# Patient Record
Sex: MALE | Race: WHITE | Employment: OTHER | URBAN - METROPOLITAN AREA
[De-identification: names, ages, dates, MRNs, and addresses within clinical notes are randomized per-mention and may not be internally consistent; named-entity substitution may affect disease eponyms.]

---

## 2017-03-01 LAB — DIABETIC RETINOPATHY: NORMAL

## 2017-06-13 ENCOUNTER — TELEPHONE (OUTPATIENT)
Dept: FAMILY MEDICINE CLINIC | Age: 62
End: 2017-06-13

## 2017-06-13 DIAGNOSIS — Z00.00 PHYSICAL EXAM: Primary | ICD-10-CM

## 2017-06-20 ENCOUNTER — OFFICE VISIT (OUTPATIENT)
Dept: FAMILY MEDICINE CLINIC | Age: 62
End: 2017-06-20

## 2017-06-20 VITALS
WEIGHT: 238.6 LBS | HEART RATE: 97 BPM | OXYGEN SATURATION: 94 % | RESPIRATION RATE: 18 BRPM | SYSTOLIC BLOOD PRESSURE: 116 MMHG | BODY MASS INDEX: 29.67 KG/M2 | HEIGHT: 75 IN | DIASTOLIC BLOOD PRESSURE: 66 MMHG

## 2017-06-20 DIAGNOSIS — Z85.46 HISTORY OF PROSTATE CANCER: ICD-10-CM

## 2017-06-20 DIAGNOSIS — E78.00 PURE HYPERCHOLESTEROLEMIA: ICD-10-CM

## 2017-06-20 DIAGNOSIS — E11.9 CONTROLLED TYPE 2 DIABETES MELLITUS WITHOUT COMPLICATION, WITHOUT LONG-TERM CURRENT USE OF INSULIN (HCC): ICD-10-CM

## 2017-06-20 DIAGNOSIS — I10 ESSENTIAL HYPERTENSION: ICD-10-CM

## 2017-06-20 DIAGNOSIS — Z00.00 ANNUAL PHYSICAL EXAM: Primary | ICD-10-CM

## 2017-06-20 LAB
BILIRUBIN, POC: NORMAL
BLOOD URINE, POC: NORMAL
CLARITY, POC: CLEAR
COLOR, POC: YELLOW
CREATININE URINE POCT: NORMAL
GLUCOSE URINE, POC: NORMAL
KETONES, POC: NORMAL
LEUKOCYTE EST, POC: NORMAL
MICROALBUMIN/CREAT 24H UR: 0 MG/G{CREAT}
MICROALBUMIN/CREAT UR-RTO: NORMAL
NITRITE, POC: NORMAL
PH, POC: 5.5
PROTEIN, POC: NORMAL
SPECIFIC GRAVITY, POC: 1.02
UROBILINOGEN, POC: 1

## 2017-06-20 PROCEDURE — 82044 UR ALBUMIN SEMIQUANTITATIVE: CPT | Performed by: INTERNAL MEDICINE

## 2017-06-20 PROCEDURE — 81002 URINALYSIS NONAUTO W/O SCOPE: CPT | Performed by: INTERNAL MEDICINE

## 2017-06-20 PROCEDURE — 99396 PREV VISIT EST AGE 40-64: CPT | Performed by: INTERNAL MEDICINE

## 2017-06-20 ASSESSMENT — PATIENT HEALTH QUESTIONNAIRE - PHQ9
SUM OF ALL RESPONSES TO PHQ QUESTIONS 1-9: 0
1. LITTLE INTEREST OR PLEASURE IN DOING THINGS: 0
SUM OF ALL RESPONSES TO PHQ9 QUESTIONS 1 & 2: 0
2. FEELING DOWN, DEPRESSED OR HOPELESS: 0

## 2017-06-20 ASSESSMENT — ENCOUNTER SYMPTOMS
GASTROINTESTINAL NEGATIVE: 1
EYES NEGATIVE: 1
ALLERGIC/IMMUNOLOGIC NEGATIVE: 1
RESPIRATORY NEGATIVE: 1

## 2017-12-21 ENCOUNTER — OFFICE VISIT (OUTPATIENT)
Dept: FAMILY MEDICINE CLINIC | Age: 62
End: 2017-12-21

## 2017-12-21 VITALS
RESPIRATION RATE: 18 BRPM | BODY MASS INDEX: 29.57 KG/M2 | HEIGHT: 75 IN | DIASTOLIC BLOOD PRESSURE: 76 MMHG | WEIGHT: 237.8 LBS | OXYGEN SATURATION: 96 % | HEART RATE: 71 BPM | SYSTOLIC BLOOD PRESSURE: 132 MMHG

## 2017-12-21 DIAGNOSIS — E11.9 CONTROLLED TYPE 2 DIABETES MELLITUS WITHOUT COMPLICATION, WITHOUT LONG-TERM CURRENT USE OF INSULIN (HCC): Primary | ICD-10-CM

## 2017-12-21 DIAGNOSIS — I10 ESSENTIAL HYPERTENSION: ICD-10-CM

## 2017-12-21 DIAGNOSIS — Z85.46 HISTORY OF PROSTATE CANCER: ICD-10-CM

## 2017-12-21 DIAGNOSIS — E78.00 PURE HYPERCHOLESTEROLEMIA: ICD-10-CM

## 2017-12-21 PROCEDURE — 99214 OFFICE O/P EST MOD 30 MIN: CPT | Performed by: INTERNAL MEDICINE

## 2017-12-21 RX ORDER — PRAVASTATIN SODIUM 20 MG
20 TABLET ORAL DAILY
Qty: 30 TABLET | Refills: 5 | Status: SHIPPED | OUTPATIENT
Start: 2017-12-21 | End: 2018-06-21 | Stop reason: SDUPTHER

## 2017-12-21 RX ORDER — LISINOPRIL 20 MG/1
20 TABLET ORAL DAILY
Qty: 30 TABLET | Refills: 5 | Status: SHIPPED | OUTPATIENT
Start: 2017-12-21 | End: 2018-06-21 | Stop reason: SDUPTHER

## 2017-12-21 ASSESSMENT — ENCOUNTER SYMPTOMS
GASTROINTESTINAL NEGATIVE: 1
RESPIRATORY NEGATIVE: 1
ALLERGIC/IMMUNOLOGIC NEGATIVE: 1

## 2017-12-21 NOTE — PROGRESS NOTES
displaced. Exam reveals no gallop and no friction rub. No murmur heard. Pulses:       Carotid pulses are 2+ on the right side, and 2+ on the left side. Radial pulses are 2+ on the right side, and 2+ on the left side. Femoral pulses are 2+ on the right side, and 2+ on the left side. Popliteal pulses are 2+ on the right side, and 2+ on the left side. Dorsalis pedis pulses are 2+ on the right side, and 2+ on the left side. Posterior tibial pulses are 2+ on the right side, and 2+ on the left side. Pulmonary/Chest: Effort normal and breath sounds normal. No accessory muscle usage. No apnea, no tachypnea and no bradypnea. No respiratory distress. He has no decreased breath sounds. He has no wheezes. He has no rhonchi. He has no rales. Abdominal: Normal appearance and normal aorta. There is no hepatosplenomegaly. There is no CVA tenderness. No hernia. Hernia confirmed negative in the ventral area. Neurological: He is alert and oriented to person, place, and time. He has normal strength. He is not disoriented. He displays no atrophy and no tremor. No cranial nerve deficit or sensory deficit. He exhibits normal muscle tone. He displays a negative Romberg sign. Coordination normal.   Diabetic foot exam was normal with intact light touch and vibratory senses. Skin: Skin is warm, dry and intact. No abrasion and no rash noted. He is not diaphoretic. No cyanosis. No pallor. Nails show no clubbing. Psychiatric: He has a normal mood and affect. His speech is normal and behavior is normal. Judgment and thought content normal. Cognition and memory are normal.       Assessment:      Problem   Controlled Type 2 Diabetes Mellitus Without Complication, Without Long-Term Current Use of Insulin (Hcc). Stable w/ diet and meds. Essential Hypertension. Stable w/ meds. Pure Hypercholesterolemia. Stable diet and exercise. History of Prostate Cancer. No new c/o. Recently saw Amena Vieira Plan:     All above problems reviewed and the found to be unchanged except for the following :     Controlled Type 2 Diabetes Mellitus Without Complication, Without Long-Term Current Use of Insulin (Hcc). Will do labs. May need to adjust meds as needed. Essential Hypertension. Continue present meds. Home BP checks. Call if>140/90. Improve diet. Avoid caffeine and salt. Call if new c/o w/ meds. Pure Hypercholesterolemia. To continue to improve diet and lose some weight. Continue med. History of Prostate Cancer. F/u w/ Uro as scheduled. Call if new c/o.

## 2018-06-11 ENCOUNTER — TELEPHONE (OUTPATIENT)
Dept: FAMILY MEDICINE CLINIC | Age: 63
End: 2018-06-11

## 2018-06-11 DIAGNOSIS — Z11.4 ENCOUNTER FOR SCREENING FOR HIV: ICD-10-CM

## 2018-06-11 DIAGNOSIS — E11.9 CONTROLLED TYPE 2 DIABETES MELLITUS WITHOUT COMPLICATION, WITHOUT LONG-TERM CURRENT USE OF INSULIN (HCC): ICD-10-CM

## 2018-06-11 DIAGNOSIS — Z11.59 ENCOUNTER FOR HEPATITIS C SCREENING TEST FOR LOW RISK PATIENT: ICD-10-CM

## 2018-06-11 DIAGNOSIS — Z00.00 ANNUAL PHYSICAL EXAM: Primary | ICD-10-CM

## 2018-06-11 DIAGNOSIS — Z12.5 SCREENING PSA (PROSTATE SPECIFIC ANTIGEN): ICD-10-CM

## 2018-06-21 ENCOUNTER — OFFICE VISIT (OUTPATIENT)
Dept: FAMILY MEDICINE CLINIC | Age: 63
End: 2018-06-21

## 2018-06-21 VITALS
DIASTOLIC BLOOD PRESSURE: 66 MMHG | SYSTOLIC BLOOD PRESSURE: 112 MMHG | HEIGHT: 75 IN | WEIGHT: 240 LBS | HEART RATE: 73 BPM | BODY MASS INDEX: 29.84 KG/M2 | OXYGEN SATURATION: 97 %

## 2018-06-21 DIAGNOSIS — E78.00 PURE HYPERCHOLESTEROLEMIA: ICD-10-CM

## 2018-06-21 DIAGNOSIS — Z85.46 HISTORY OF PROSTATE CANCER: ICD-10-CM

## 2018-06-21 DIAGNOSIS — E11.9 CONTROLLED TYPE 2 DIABETES MELLITUS WITHOUT COMPLICATION, WITHOUT LONG-TERM CURRENT USE OF INSULIN (HCC): ICD-10-CM

## 2018-06-21 DIAGNOSIS — Z00.00 ANNUAL PHYSICAL EXAM: Primary | ICD-10-CM

## 2018-06-21 DIAGNOSIS — I10 ESSENTIAL HYPERTENSION: ICD-10-CM

## 2018-06-21 LAB
BILIRUBIN, POC: 0
BLOOD URINE, POC: 0
CLARITY, POC: NORMAL
COLOR, POC: NORMAL
GLUCOSE URINE, POC: 0
KETONES, POC: 0
LEUKOCYTE EST, POC: 0
NITRITE, POC: 0
PH, POC: 5.5
PROTEIN, POC: 0
SPECIFIC GRAVITY, POC: 1.02
UROBILINOGEN, POC: 0.2

## 2018-06-21 PROCEDURE — 81002 URINALYSIS NONAUTO W/O SCOPE: CPT | Performed by: INTERNAL MEDICINE

## 2018-06-21 PROCEDURE — 99396 PREV VISIT EST AGE 40-64: CPT | Performed by: INTERNAL MEDICINE

## 2018-06-21 RX ORDER — LISINOPRIL 20 MG/1
20 TABLET ORAL DAILY
Qty: 30 TABLET | Refills: 5 | Status: SHIPPED | OUTPATIENT
Start: 2018-06-21 | End: 2019-01-03 | Stop reason: SDUPTHER

## 2018-06-21 RX ORDER — PRAVASTATIN SODIUM 20 MG
20 TABLET ORAL DAILY
Qty: 30 TABLET | Refills: 5 | Status: SHIPPED | OUTPATIENT
Start: 2018-06-21 | End: 2019-01-03 | Stop reason: SDUPTHER

## 2018-06-21 RX ORDER — LISINOPRIL 20 MG/1
20 TABLET ORAL DAILY
Qty: 30 TABLET | Refills: 5 | Status: CANCELLED | OUTPATIENT
Start: 2018-06-21

## 2018-06-21 RX ORDER — PRAVASTATIN SODIUM 20 MG
20 TABLET ORAL DAILY
Qty: 30 TABLET | Refills: 5 | Status: CANCELLED | OUTPATIENT
Start: 2018-06-21

## 2018-06-21 ASSESSMENT — PATIENT HEALTH QUESTIONNAIRE - PHQ9
1. LITTLE INTEREST OR PLEASURE IN DOING THINGS: 0
SUM OF ALL RESPONSES TO PHQ9 QUESTIONS 1 & 2: 0
SUM OF ALL RESPONSES TO PHQ QUESTIONS 1-9: 0
2. FEELING DOWN, DEPRESSED OR HOPELESS: 0

## 2018-06-21 ASSESSMENT — ENCOUNTER SYMPTOMS
RESPIRATORY NEGATIVE: 1
EYES NEGATIVE: 1
GASTROINTESTINAL NEGATIVE: 1
ALLERGIC/IMMUNOLOGIC NEGATIVE: 1

## 2019-01-03 ENCOUNTER — OFFICE VISIT (OUTPATIENT)
Dept: FAMILY MEDICINE CLINIC | Age: 64
End: 2019-01-03
Payer: COMMERCIAL

## 2019-01-03 VITALS
RESPIRATION RATE: 16 BRPM | OXYGEN SATURATION: 98 % | SYSTOLIC BLOOD PRESSURE: 118 MMHG | HEART RATE: 82 BPM | BODY MASS INDEX: 30.36 KG/M2 | WEIGHT: 244.2 LBS | DIASTOLIC BLOOD PRESSURE: 78 MMHG | HEIGHT: 75 IN

## 2019-01-03 DIAGNOSIS — E11.9 CONTROLLED TYPE 2 DIABETES MELLITUS WITHOUT COMPLICATION, WITHOUT LONG-TERM CURRENT USE OF INSULIN (HCC): Primary | ICD-10-CM

## 2019-01-03 DIAGNOSIS — I10 ESSENTIAL HYPERTENSION: ICD-10-CM

## 2019-01-03 DIAGNOSIS — D12.4 ADENOMATOUS POLYP OF DESCENDING COLON: ICD-10-CM

## 2019-01-03 DIAGNOSIS — Z85.46 HISTORY OF PROSTATE CANCER: ICD-10-CM

## 2019-01-03 DIAGNOSIS — E78.00 PURE HYPERCHOLESTEROLEMIA: ICD-10-CM

## 2019-01-03 PROCEDURE — 99214 OFFICE O/P EST MOD 30 MIN: CPT | Performed by: INTERNAL MEDICINE

## 2019-01-03 RX ORDER — PRAVASTATIN SODIUM 20 MG
20 TABLET ORAL DAILY
Qty: 30 TABLET | Refills: 5 | Status: SHIPPED | OUTPATIENT
Start: 2019-01-03 | End: 2019-07-11 | Stop reason: SDUPTHER

## 2019-01-03 RX ORDER — LISINOPRIL 20 MG/1
20 TABLET ORAL DAILY
Qty: 30 TABLET | Refills: 5 | Status: SHIPPED | OUTPATIENT
Start: 2019-01-03 | End: 2019-07-11 | Stop reason: SDUPTHER

## 2019-01-03 ASSESSMENT — ENCOUNTER SYMPTOMS
RESPIRATORY NEGATIVE: 1
EYES NEGATIVE: 1
ALLERGIC/IMMUNOLOGIC NEGATIVE: 1
GASTROINTESTINAL NEGATIVE: 1

## 2019-01-04 LAB
BUN / CREAT RATIO: ABNORMAL (CALC) (ref 6–22)
BUN BLDV-MCNC: 16 MG/DL (ref 7–25)
CALCIUM SERPL-MCNC: 10.4 MG/DL (ref 8.6–10.3)
CHLORIDE BLD-SCNC: 104 MMOL/L (ref 98–110)
CO2: 28 MMOL/L (ref 20–32)
CREAT SERPL-MCNC: 0.76 MG/DL (ref 0.7–1.25)
GFR AFRICAN AMERICAN: 113 ML/MIN/1.73M2
GFR SERPL CREATININE-BSD FRML MDRD: 98 ML/MIN/1.73M2
GLUCOSE BLD-MCNC: 118 MG/DL (ref 65–99)
HBA1C MFR BLD: 5.6 % OF TOTAL HGB
POTASSIUM SERPL-SCNC: 4.8 MMOL/L (ref 3.5–5.3)
SODIUM BLD-SCNC: 141 MMOL/L (ref 135–146)

## 2019-07-01 ENCOUNTER — TELEPHONE (OUTPATIENT)
Dept: FAMILY MEDICINE CLINIC | Age: 64
End: 2019-07-01

## 2019-07-01 DIAGNOSIS — E11.9 CONTROLLED TYPE 2 DIABETES MELLITUS WITHOUT COMPLICATION, WITHOUT LONG-TERM CURRENT USE OF INSULIN (HCC): ICD-10-CM

## 2019-07-01 DIAGNOSIS — Z12.5 SCREENING PSA (PROSTATE SPECIFIC ANTIGEN): ICD-10-CM

## 2019-07-01 DIAGNOSIS — Z00.00 ANNUAL PHYSICAL EXAM: Primary | ICD-10-CM

## 2019-07-11 ENCOUNTER — OFFICE VISIT (OUTPATIENT)
Dept: FAMILY MEDICINE CLINIC | Age: 64
End: 2019-07-11
Payer: COMMERCIAL

## 2019-07-11 VITALS
HEART RATE: 75 BPM | HEIGHT: 75 IN | SYSTOLIC BLOOD PRESSURE: 122 MMHG | RESPIRATION RATE: 16 BRPM | WEIGHT: 246 LBS | DIASTOLIC BLOOD PRESSURE: 74 MMHG | BODY MASS INDEX: 30.59 KG/M2 | OXYGEN SATURATION: 98 %

## 2019-07-11 DIAGNOSIS — E78.00 PURE HYPERCHOLESTEROLEMIA: ICD-10-CM

## 2019-07-11 DIAGNOSIS — Z85.46 HISTORY OF PROSTATE CANCER: ICD-10-CM

## 2019-07-11 DIAGNOSIS — D12.4 ADENOMATOUS POLYP OF DESCENDING COLON: ICD-10-CM

## 2019-07-11 DIAGNOSIS — I10 ESSENTIAL HYPERTENSION: ICD-10-CM

## 2019-07-11 DIAGNOSIS — E11.9 CONTROLLED TYPE 2 DIABETES MELLITUS WITHOUT COMPLICATION, WITHOUT LONG-TERM CURRENT USE OF INSULIN (HCC): ICD-10-CM

## 2019-07-11 DIAGNOSIS — Z00.00 ANNUAL PHYSICAL EXAM: Primary | ICD-10-CM

## 2019-07-11 LAB
BILIRUBIN, POC: 0
BLOOD URINE, POC: 0
CLARITY, POC: CLEAR
COLOR, POC: YELLOW
CREATININE URINE POCT: ABNORMAL
GLUCOSE URINE, POC: 0
KETONES, POC: 0
LEUKOCYTE EST, POC: 0
MICROALBUMIN/CREAT 24H UR: 20 MG/G{CREAT}
MICROALBUMIN/CREAT UR-RTO: ABNORMAL
NITRITE, POC: 0
PH, POC: 5
PROTEIN, POC: 0
SPECIFIC GRAVITY, POC: 1.03
UROBILINOGEN, POC: 0.2

## 2019-07-11 PROCEDURE — 99396 PREV VISIT EST AGE 40-64: CPT | Performed by: INTERNAL MEDICINE

## 2019-07-11 PROCEDURE — 82044 UR ALBUMIN SEMIQUANTITATIVE: CPT | Performed by: INTERNAL MEDICINE

## 2019-07-11 PROCEDURE — 81002 URINALYSIS NONAUTO W/O SCOPE: CPT | Performed by: INTERNAL MEDICINE

## 2019-07-11 RX ORDER — LISINOPRIL 20 MG/1
20 TABLET ORAL DAILY
Qty: 90 TABLET | Refills: 3 | Status: SHIPPED | OUTPATIENT
Start: 2019-07-11 | End: 2020-07-14 | Stop reason: SDUPTHER

## 2019-07-11 RX ORDER — PRAVASTATIN SODIUM 20 MG
20 TABLET ORAL DAILY
Qty: 90 TABLET | Refills: 3 | Status: SHIPPED | OUTPATIENT
Start: 2019-07-11 | End: 2020-07-14 | Stop reason: SDUPTHER

## 2019-07-11 RX ORDER — METRONIDAZOLE 7.5 MG/G
GEL TOPICAL
Qty: 1 TUBE | Refills: 5 | Status: SHIPPED | OUTPATIENT
Start: 2019-07-11 | End: 2020-07-14 | Stop reason: SDUPTHER

## 2019-07-11 ASSESSMENT — ENCOUNTER SYMPTOMS
RESPIRATORY NEGATIVE: 1
ALLERGIC/IMMUNOLOGIC NEGATIVE: 1
EYES NEGATIVE: 1
GASTROINTESTINAL NEGATIVE: 1

## 2019-07-11 ASSESSMENT — PATIENT HEALTH QUESTIONNAIRE - PHQ9
SUM OF ALL RESPONSES TO PHQ QUESTIONS 1-9: 0
SUM OF ALL RESPONSES TO PHQ9 QUESTIONS 1 & 2: 0
1. LITTLE INTEREST OR PLEASURE IN DOING THINGS: 0
2. FEELING DOWN, DEPRESSED OR HOPELESS: 0
SUM OF ALL RESPONSES TO PHQ QUESTIONS 1-9: 0

## 2019-07-11 NOTE — PROGRESS NOTES
discharge. No scleral icterus. Fundoscopic exam:       The right eye shows no arteriolar narrowing, no AV nicking, no exudate, no hemorrhage and no papilledema. The right eye shows no red reflex and no venous pulsations. The left eye shows no arteriolar narrowing, no AV nicking, no exudate, no hemorrhage and no papilledema. The left eye shows no red reflex and no venous pulsations. Neck: Trachea normal, normal range of motion and full passive range of motion without pain. Neck supple. Normal carotid pulses, no hepatojugular reflux and no JVD present. Carotid bruit is not present. No tracheal deviation present. No thyroid mass and no thyromegaly present. Cardiovascular: Normal rate, regular rhythm, normal heart sounds, intact distal pulses and normal pulses. No extrasystoles are present. PMI is not displaced. Exam reveals no gallop, no friction rub and no decreased pulses. No murmur heard. Pulses:       Carotid pulses are 2+ on the right side, and 2+ on the left side. Radial pulses are 2+ on the right side, and 2+ on the left side. Femoral pulses are 2+ on the right side, and 2+ on the left side. Popliteal pulses are 2+ on the right side, and 2+ on the left side. Dorsalis pedis pulses are 2+ on the right side, and 2+ on the left side. Posterior tibial pulses are 2+ on the right side, and 2+ on the left side. Pulmonary/Chest: Effort normal and breath sounds normal. No accessory muscle usage or stridor. No apnea, no tachypnea and no bradypnea. No respiratory distress. He has no decreased breath sounds. He has no wheezes. He has no rhonchi. He has no rales. He exhibits no tenderness. Abdominal: Soft. Normal appearance, normal aorta and bowel sounds are normal. He exhibits no shifting dullness, no distension, no pulsatile liver, no fluid wave, no abdominal bruit, no ascites, no pulsatile midline mass and no mass. There is no hepatosplenomegaly. There is no tenderness. is not diaphoretic. No cyanosis or erythema. No pallor. Nails show no clubbing. Rosacea nose worse, Tinea copra worse. Skin tags unchanged. SK back unchanged. Psychiatric: He has a normal mood and affect. His speech is normal and behavior is normal. Judgment and thought content normal. Cognition and memory are normal.       Assessment:      Problem   Annual Physical Exam   Colon Polyps. Due for Colonoscopy 2020   Controlled Type 2 Diabetes Mellitus Without Complication, Without Long-Term Current Use of Insulin ([facility]). Very good DM control. Essential Hypertension. Good w/ meds. Pure Hypercholesterolemia. Good w/ meds. History of Prostate Cancer. No new c/o. Plan:      All above problems reviewed and the found to be unchanged except for the following :     Annual Physical Exam. Flu shot in Oct/Nov.     Colon Polyps. To GI for repeat colonoscopy 8/2010. Controlled Type 2 Diabetes Mellitus Without Complication, Without Long-Term Current Use of Insulin ([facility]). To continue to improve diet and exercise. continue present meds. Call if new c/o. Essential Hypertension. Continue present meds. Home BP checks. Call if>140/90. Improve diet. Avoid caffeine and salt. Call if new c/o w/ meds. Pure Hypercholesterolemia. To improve diet and exercise. Lose a few lbs. To take Pravastatin in AM w/ other meds so don't forget. History of Prostate Cancer. To Uro as scheduled. Rosacea. Metro gel at bed time. Tinea Copra. Miconazole cream or spray. Once controlled then Gold bond powder. Call if new c/o.            Alisson Fuller MD

## 2019-07-11 NOTE — PATIENT INSTRUCTIONS
All above problems reviewed and the found to be unchanged except for the following :     Annual Physical Exam. Flu shot in Oct/Nov.     Colon Polyps. To GI for repeat colonoscopy 8/2010. Controlled Type 2 Diabetes Mellitus Without Complication, Without Long-Term Current Use of Insulin (Hcc). To continue to improve diet and exercise. continue present meds. Call if new c/o. Essential Hypertension. Continue present meds. Home BP checks. Call if>140/90. Improve diet. Avoid caffeine and salt. Call if new c/o w/ meds. Pure Hypercholesterolemia. To improve diet and exercise. Lose a few lbs. To take Pravastatin in AM w/ other meds so don't forget. History of Prostate Cancer. To Uro as scheduled. Rosacea. Metro gel at bed time. Tinea Copra. Miconazole cream or spray. Once controlled then Gold bond powder. Call if new c/o. Prostate: today  Colonoscopy: 2015. rechx 5 yr(8/2020)  DEXA: na  Eye: 4/12/2019  Hearing: passed in office exam  Immunization: flu shot in the fall.    MMSE: na  Get Up and Go: na  Tob: nonsmoker/never  ETOH: 3 glasses wine/night  Caffeine: low  Cardiac Risk Assessment: 11% 10 yr risk w/ [place]  Living will: yes  Medical power of : yes

## 2020-01-14 ENCOUNTER — OFFICE VISIT (OUTPATIENT)
Dept: FAMILY MEDICINE CLINIC | Age: 65
End: 2020-01-14
Payer: COMMERCIAL

## 2020-01-14 VITALS
BODY MASS INDEX: 29.97 KG/M2 | DIASTOLIC BLOOD PRESSURE: 84 MMHG | HEIGHT: 75 IN | SYSTOLIC BLOOD PRESSURE: 124 MMHG | HEART RATE: 77 BPM | RESPIRATION RATE: 18 BRPM | OXYGEN SATURATION: 98 % | WEIGHT: 241 LBS

## 2020-01-14 PROCEDURE — 99214 OFFICE O/P EST MOD 30 MIN: CPT | Performed by: INTERNAL MEDICINE

## 2020-01-14 ASSESSMENT — PATIENT HEALTH QUESTIONNAIRE - PHQ9
2. FEELING DOWN, DEPRESSED OR HOPELESS: 0
SUM OF ALL RESPONSES TO PHQ QUESTIONS 1-9: 0
SUM OF ALL RESPONSES TO PHQ QUESTIONS 1-9: 0
SUM OF ALL RESPONSES TO PHQ9 QUESTIONS 1 & 2: 0
1. LITTLE INTEREST OR PLEASURE IN DOING THINGS: 0

## 2020-01-14 ASSESSMENT — ENCOUNTER SYMPTOMS
RESPIRATORY NEGATIVE: 1
GASTROINTESTINAL NEGATIVE: 1
ALLERGIC/IMMUNOLOGIC NEGATIVE: 1

## 2020-01-14 NOTE — PATIENT INSTRUCTIONS
All above problems reviewed and the found to be unchanged except for the following :     Controlled Type 2 Diabetes Mellitus Without Complication, Without Long-Term Current Use of Insulin (Hcc). Will do labs and adjust meds as needed. Improve diet. Lose weight. Essential Hypertension. Continue present meds. Home BP checks. Call if>140/90. Improve diet. Avoid caffeine and salt. Call if new c/o w/ meds. Pure Hypercholesterolemia. Will do labs and adjust meds. History of Prostate Cancer. To uro as scheduled.

## 2020-01-14 NOTE — ASSESSMENT & PLAN NOTE
Sugars are good, diet is stable, weight is stable, no reported neuropathy, no change in vision, no claudication, no foot ulcers, no new skin lesions. No change in medications. No c/o with meds. Last eye exam 4/2019.

## 2020-01-14 NOTE — PROGRESS NOTES
Pure Hypercholesterolemia. Good w/ meds. History of Prostate Cancer. No new c/o. Plan:      All above problems reviewed and the found to be unchanged except for the following :     Controlled Type 2 Diabetes Mellitus Without Complication, Without Long-Term Current Use of Insulin (Hcc). Will do labs and adjust meds as needed. Improve diet. Lose weight. Essential Hypertension. Continue present meds. Home BP checks. Call if>140/90. Improve diet. Avoid caffeine and salt. Call if new c/o w/ meds. Pure Hypercholesterolemia. Will do labs and adjust meds. History of Prostate Cancer. To uro as scheduled.              Nellie Salmon MD

## 2020-01-14 NOTE — ASSESSMENT & PLAN NOTE
Doing well w/o c/o. DX in 2006. tx w/ prostatectomy then later tx w/ radiation tx. Saw Dr Crow Estrada in 11/2019.  Labs were ok  PSA stable to 1.9

## 2020-01-15 LAB
A/G RATIO: 2 (CALC) (ref 1–2.5)
ALBUMIN SERPL-MCNC: 4.7 G/DL (ref 3.6–5.1)
ALP BLD-CCNC: 50 U/L (ref 40–115)
ALT SERPL-CCNC: 23 U/L (ref 9–46)
AST SERPL-CCNC: 19 U/L (ref 10–35)
BILIRUB SERPL-MCNC: 0.8 MG/DL (ref 0.2–1.2)
BILIRUBIN DIRECT: 0.2 MG/DL
BILIRUBIN, INDIRECT: 0.6 MG/DL (CALC) (ref 0.2–1.2)
BUN / CREAT RATIO: ABNORMAL (CALC) (ref 6–22)
BUN BLDV-MCNC: 21 MG/DL (ref 7–25)
CALCIUM SERPL-MCNC: 9.6 MG/DL (ref 8.6–10.3)
CHLORIDE BLD-SCNC: 101 MMOL/L (ref 98–110)
CHOLESTEROL, TOTAL: 209 MG/DL
CHOLESTEROL/HDL RATIO: 4.1 (CALC)
CHOLESTEROL: 158 MG/DL (CALC)
CO2: 28 MMOL/L (ref 20–32)
CREAT SERPL-MCNC: 0.86 MG/DL (ref 0.7–1.25)
GFR AFRICAN AMERICAN: 107 ML/MIN/1.73M2
GFR, ESTIMATED: 92 ML/MIN/1.73M2
GLOBULIN: 2.3 G/DL (CALC) (ref 1.9–3.7)
GLUCOSE BLD-MCNC: 114 MG/DL (ref 65–99)
HBA1C MFR BLD: 5.7 % OF TOTAL HGB
HDLC SERPL-MCNC: 51 MG/DL
LDL CHOLESTEROL CALCULATED: 126 MG/DL (CALC)
POTASSIUM SERPL-SCNC: 5 MMOL/L (ref 3.5–5.3)
SODIUM BLD-SCNC: 138 MMOL/L (ref 135–146)
TOTAL PROTEIN: 7 G/DL (ref 6.1–8.1)
TRIGL SERPL-MCNC: 205 MG/DL

## 2020-07-07 ENCOUNTER — TELEPHONE (OUTPATIENT)
Dept: FAMILY MEDICINE CLINIC | Age: 65
End: 2020-07-07

## 2020-07-07 NOTE — TELEPHONE ENCOUNTER
Patient will be going to [facility] tomorrow morning for his fbw. Please fax orders to [facility] at [phone]. Please let patient know orders have been faxed. ;

## 2020-07-14 ENCOUNTER — OFFICE VISIT (OUTPATIENT)
Dept: FAMILY MEDICINE CLINIC | Age: 65
End: 2020-07-14
Payer: COMMERCIAL

## 2020-07-14 VITALS
BODY MASS INDEX: 30.44 KG/M2 | SYSTOLIC BLOOD PRESSURE: 118 MMHG | HEART RATE: 78 BPM | DIASTOLIC BLOOD PRESSURE: 78 MMHG | OXYGEN SATURATION: 98 % | RESPIRATION RATE: 16 BRPM | HEIGHT: 75 IN | WEIGHT: 244.8 LBS | TEMPERATURE: 97.9 F

## 2020-07-14 LAB
CREATININE URINE: 90.1 MG/DL (ref 39–259)
MICROALBUMIN UR-MCNC: <1.2 MG/DL
MICROALBUMIN/CREAT UR-RTO: NORMAL MG/G (ref 0–30)

## 2020-07-14 PROCEDURE — 99396 PREV VISIT EST AGE 40-64: CPT | Performed by: INTERNAL MEDICINE

## 2020-07-14 RX ORDER — LISINOPRIL 20 MG/1
20 TABLET ORAL DAILY
Qty: 90 TABLET | Refills: 3 | Status: SHIPPED | OUTPATIENT
Start: 2020-07-14 | End: 2021-07-22 | Stop reason: SDUPTHER

## 2020-07-14 RX ORDER — PRAVASTATIN SODIUM 20 MG
20 TABLET ORAL DAILY
Qty: 90 TABLET | Refills: 3 | Status: SHIPPED | OUTPATIENT
Start: 2020-07-14 | End: 2021-07-22 | Stop reason: SDUPTHER

## 2020-07-14 RX ORDER — METRONIDAZOLE 7.5 MG/G
GEL TOPICAL
Qty: 1 TUBE | Refills: 5 | Status: SHIPPED | OUTPATIENT
Start: 2020-07-14 | End: 2022-07-27

## 2020-07-14 NOTE — PROGRESS NOTES
therapeutic multivitamin-minerals (THERAGRAN-M) tablet Take 1 tablet by mouth daily.  fish oil-omega-3 fatty acids 1000 MG capsule Take 2 g by mouth daily.  glucose blood VI test strips (FREESTYLE LITE) strip by In Vitro route as needed. Test 3 times a week or as directed       FreeStyle Lancets MISC by Does not apply route. Test 3 times a week or as directed        No current facility-administered medications for this visit. No Known Allergies  Social History     Tobacco Use    Smoking status: Never Smoker    Smokeless tobacco: Never Used   Substance Use Topics    Alcohol use: Yes     Family History   Problem Relation Age of Onset    Cancer Father         prostate       Review of Systems   Constitutional: Negative. HENT: Negative. Eyes: Negative. Respiratory: Negative. Cardiovascular: Negative. Gastrointestinal: Negative. Endocrine: Negative. Genitourinary: Negative. Musculoskeletal: Negative. Skin: Negative. Allergic/Immunologic: Negative. Neurological: Negative. Hematological: Negative. Psychiatric/Behavioral: Negative. Vitals:    07/14/20 1109 07/14/20 1150   BP: 122/76 118/78   Pulse: 78    Resp: 16    Temp: 97.9 °F (36.6 °C)    SpO2: 98%    Weight: 244 lb 12.8 oz (111 kg)    Height: 6' 3\" (1.905 m)      Objective:   Physical Exam  Constitutional:       General: He is not in acute distress. Appearance: Normal appearance. He is well-developed. He is obese. He is not ill-appearing, toxic-appearing or diaphoretic. HENT:      Head: Normocephalic and atraumatic. Right Ear: Hearing, tympanic membrane, ear canal and external ear normal. There is no impacted cerumen. Left Ear: Hearing, tympanic membrane, ear canal and external ear normal. There is no impacted cerumen. Nose: Nose normal. No congestion or rhinorrhea. Right Sinus: No maxillary sinus tenderness or frontal sinus tenderness.       Left Sinus: No maxillary sinus tenderness or frontal sinus tenderness. Mouth/Throat:      Mouth: Mucous membranes are moist.      Pharynx: Oropharynx is clear. Uvula midline. No oropharyngeal exudate or posterior oropharyngeal erythema. Eyes:      General: Lids are normal. No scleral icterus. Right eye: No discharge. Left eye: No discharge. Extraocular Movements: Extraocular movements intact. Conjunctiva/sclera: Conjunctivae normal.      Pupils: Pupils are equal, round, and reactive to light. Funduscopic exam:     Right eye: No hemorrhage, exudate, AV nicking, arteriolar narrowing or papilledema. Left eye: No hemorrhage, exudate, AV nicking, arteriolar narrowing or papilledema. Neck:      Musculoskeletal: Full passive range of motion without pain, normal range of motion and neck supple. No neck rigidity or muscular tenderness. Thyroid: No thyroid mass or thyromegaly. Vascular: Normal carotid pulses. No carotid bruit, hepatojugular reflux or JVD. Trachea: Trachea normal. No tracheal deviation. Cardiovascular:      Rate and Rhythm: Normal rate and regular rhythm. No extrasystoles are present. Chest Wall: PMI is not displaced. Pulses: Normal pulses. No decreased pulses. Carotid pulses are 2+ on the right side and 2+ on the left side. Radial pulses are 2+ on the right side and 2+ on the left side. Femoral pulses are 2+ on the right side and 2+ on the left side. Popliteal pulses are 2+ on the right side and 2+ on the left side. Dorsalis pedis pulses are 2+ on the right side and 2+ on the left side. Posterior tibial pulses are 2+ on the right side and 2+ on the left side. Heart sounds: Normal heart sounds. No murmur. No friction rub. No gallop. Pulmonary:      Effort: Pulmonary effort is normal. No tachypnea, bradypnea, accessory muscle usage or respiratory distress. Breath sounds: Normal breath sounds. No stridor.  No decreased breath sounds, wheezing, rhonchi or rales. Chest:      Chest wall: No tenderness. Abdominal:      General: Bowel sounds are normal. There is no distension or abdominal bruit. Palpations: Abdomen is soft. There is no shifting dullness, fluid wave, mass or pulsatile mass. Tenderness: There is no abdominal tenderness. There is no right CVA tenderness, left CVA tenderness, guarding or rebound. Hernia: No hernia is present. There is no hernia in the ventral area or left inguinal area. Comments: Umbilical hernia same. Genitourinary:     Penis: Normal. No tenderness. Scrotum/Testes: Normal. Cremasteric reflex is present. Prostate: Normal.      Rectum: Normal. Guaiac result negative. Musculoskeletal: Normal range of motion. General: No swelling, tenderness, deformity or signs of injury. Right lower leg: No edema. Left lower leg: No edema. Lymphadenopathy:      Head:      Right side of head: No submental, submandibular, tonsillar, preauricular, posterior auricular or occipital adenopathy. Left side of head: No submental, submandibular, tonsillar, preauricular, posterior auricular or occipital adenopathy. Cervical: No cervical adenopathy. Upper Body:      Right upper body: No supraclavicular or epitrochlear adenopathy. Left upper body: No supraclavicular or epitrochlear adenopathy. Skin:     General: Skin is warm and dry. Capillary Refill: Capillary refill takes less than 2 seconds. Coloration: Skin is not jaundiced or pale. Findings: No abrasion, bruising, erythema, lesion or rash. Nails: There is no clubbing. Neurological:      General: No focal deficit present. Mental Status: He is alert and oriented to person, place, and time. Mental status is at baseline. He is not disoriented. Cranial Nerves: No cranial nerve deficit. Sensory: No sensory deficit.       Motor: No weakness, tremor, atrophy, improve diet and lose some weight. Continue med. Increase activity as tolerated. History of Prostate Cancer. To Uro as scheduled.              Colton Varghese MD

## 2020-07-14 NOTE — ASSESSMENT & PLAN NOTE
Sugars are good, diet is stable, weight is stable, no reported neuropathy, no change in vision, no claudication, no foot ulcers, no new skin lesions. No change in medications. No c/o with meds. Last eye exam 7/2020.

## 2020-07-14 NOTE — ASSESSMENT & PLAN NOTE
Prostate: today  Colonoscopy: 2015. rechx 5 yr(8/2020)  DEXA: na  Eye: 7/2020  Hearing: passed in office exam  Immunization: flu shot in the fall. Rx for Shingrix.   MMSE: na  Get Up and Go: na  Tob: nonsmoker/never  ETOH: 3 glasses wine/night  Caffeine: low  Cardiac Risk Assessment: 11% 10 yr risk w/ [place]  Living will: yes  Medical power of : yes

## 2020-07-14 NOTE — PATIENT INSTRUCTIONS
All above problems reviewed and the found to be unchanged except for the following :     Annual Physical Exam. Flu shot in Oct/Nov.     Colon Polyps. Colonoscopy due soon. Controlled Type 2 Diabetes Mellitus Without Complication, Without Long-Term Current Use of Insulin ([facility]). To continue to improve diet and lose weight. To follow Diabetic diet. If needs further help w/ Diabetic diet to call and will refer back to dietician. Home sugar checks. To call if sudden change up or down in sugars. To do regular foot checks. Call if new problems. Essential Hypertension. Continue present meds. Home BP checks. Call if>140/90. Improve diet. Avoid caffeine and salt. Call if new c/o w/ meds. Pure Hypercholesterolemia. To improve diet and lose some weight. Continue med. Increase activity as tolerated. History of Prostate Cancer. To Uro as scheduled. Prostate: today  Colonoscopy: 2015. rechx 5 yr(8/2020)  DEXA: na  Eye: 7/2020  Hearing: passed in office exam  Immunization: flu shot in the fall. Rx for Shingrix.   MMSE: na  Get Up and Go: na  Tob: nonsmoker/never  ETOH: 3 glasses wine/night  Caffeine: low  Cardiac Risk Assessment: 11% 10 yr risk w/ [place]  Living will: yes  Medical power of : yes

## 2021-01-21 ENCOUNTER — VIRTUAL VISIT (OUTPATIENT)
Dept: FAMILY MEDICINE CLINIC | Age: 66
End: 2021-01-21
Payer: COMMERCIAL

## 2021-01-21 DIAGNOSIS — E11.9 CONTROLLED TYPE 2 DIABETES MELLITUS WITHOUT COMPLICATION, WITHOUT LONG-TERM CURRENT USE OF INSULIN (HCC): ICD-10-CM

## 2021-01-21 DIAGNOSIS — Z85.46 HISTORY OF PROSTATE CANCER: ICD-10-CM

## 2021-01-21 DIAGNOSIS — I10 ESSENTIAL HYPERTENSION: ICD-10-CM

## 2021-01-21 DIAGNOSIS — E78.00 PURE HYPERCHOLESTEROLEMIA: ICD-10-CM

## 2021-01-21 PROCEDURE — 99214 OFFICE O/P EST MOD 30 MIN: CPT | Performed by: INTERNAL MEDICINE

## 2021-01-21 SDOH — ECONOMIC STABILITY: INCOME INSECURITY: HOW HARD IS IT FOR YOU TO PAY FOR THE VERY BASICS LIKE FOOD, HOUSING, MEDICAL CARE, AND HEATING?: NOT HARD AT ALL

## 2021-01-21 SDOH — ECONOMIC STABILITY: FOOD INSECURITY: WITHIN THE PAST 12 MONTHS, THE FOOD YOU BOUGHT JUST DIDN'T LAST AND YOU DIDN'T HAVE MONEY TO GET MORE.: NEVER TRUE

## 2021-01-21 ASSESSMENT — PATIENT HEALTH QUESTIONNAIRE - PHQ9
1. LITTLE INTEREST OR PLEASURE IN DOING THINGS: 0
SUM OF ALL RESPONSES TO PHQ QUESTIONS 1-9: 0
SUM OF ALL RESPONSES TO PHQ9 QUESTIONS 1 & 2: 0
SUM OF ALL RESPONSES TO PHQ QUESTIONS 1-9: 0

## 2021-01-21 ASSESSMENT — ENCOUNTER SYMPTOMS
ALLERGIC/IMMUNOLOGIC NEGATIVE: 1
GASTROINTESTINAL NEGATIVE: 1
RESPIRATORY NEGATIVE: 1

## 2021-01-21 NOTE — ASSESSMENT & PLAN NOTE
Doing well w/o c/o. DX in 2006. tx w/ prostatectomy then later tx w/ radiation tx. Saw Dr Oscar Dewey in 6/2020.  Labs were ok  PSA stable to 1.9

## 2021-01-21 NOTE — PROGRESS NOTES
2021    TELEHEALTH EVALUATION -- Audio/Visual (During UXYNH-60 public health emergency)    HPI:    Sandeep Fernandez (:  1955) has requested an audio/video evaluation for the following concern(s):    Controlled type 2 diabetes mellitus without complication, without long-term current use of insulin (HCC)  Sugars are good( w/ last HbA1c of 5.9%), diet is stable, weight is stable, no reported neuropathy, no change in vision, no claudication, no foot ulcers, no new skin lesions. No change in medications. No c/o with meds. Last eye exam 2020. Essential hypertension  No change in meds, no c/o with meds, no chest pain, SOB, palpatations, or syncope. Home bp was good.(122/78 today pt taken at home)    Pure hypercholesterolemia  Stable diet and wt. Taking meds consistently. Last Labs good. History of prostate cancer  Doing well w/o c/o. DX in . tx w/ prostatectomy then later tx w/ radiation tx. Saw Dr Maite Romero in 2020. Labs were ok  PSA stable to 1.9    Review of Systems   Constitutional: Positive for fatigue. Respiratory: Negative. Cardiovascular: Negative. Gastrointestinal: Negative. Endocrine: Negative. Genitourinary: Negative. Musculoskeletal: Negative. Skin: Negative. Allergic/Immunologic: Negative. Neurological: Negative. Hematological: Negative. Psychiatric/Behavioral: Negative. Prior to Visit Medications    Medication Sig Taking? Authorizing Provider   metFORMIN (GLUCOPHAGE) 500 MG tablet Take 1 tablet by mouth daily (with breakfast)  C Shantanu Gimenez MD   lisinopril (PRINIVIL;ZESTRIL) 20 MG tablet Take 1 tablet by mouth daily  DESTINY Gimenez MD   pravastatin (PRAVACHOL) 20 MG tablet Take 1 tablet by mouth daily  C Shantanu Gimenez MD   metroNIDAZOLE (METROGEL) 0.75 % gel Apply topically 2 times daily. Lali Fonseca MD   therapeutic multivitamin-minerals [facility]) tablet Take 1 tablet by mouth daily.   Historical Provider, MD fish oil-omega-3 fatty acids 1000 MG capsule Take 2 g by mouth daily. Historical Provider, MD   glucose blood VI test strips (FREESTYLE LITE) strip by In Vitro route as needed. Test 3 times a week or as directed   Historical Provider, MD Barkeryle Lancets MISC by Does not apply route. Test 3 times a week or as directed   Historical Provider, MD       Social History     Tobacco Use    Smoking status: Never Smoker    Smokeless tobacco: Never Used   Substance Use Topics    Alcohol use:  Yes    Drug use: No        No Known Allergies,   Past Medical History:   Diagnosis Date    Cancer ([address]     prostate    Hyperglycemia     Hyperlipidemia     Knee injury     right knee   ,   Past Surgical History:   Procedure Laterality Date    PROSTATE SURGERY      prostate removal    TONSILLECTOMY     ,   Family History   Problem Relation Age of Onset    Cancer Father         prostate   ,   Immunization History   Administered Date(s) Administered    Influenza Vaccine, unspecified formulation 11/01/2016, 10/30/2017    Influenza Virus Vaccine 11/21/2012, 10/30/2013, 11/05/2015, 11/04/2019, 11/01/2020    Influenza Whole 11/21/2012, 10/30/2013    Influenza, High Dose (Fluzone 65 yrs and older) 11/03/2016    Influenza, MDCK Quadv, IM, PF (Flucelvax 4 yrs and older) 10/26/2018    Influenza, Quadv, IM, PF (6 mo and older Fluzone, Flulaval, Fluarix, and 3 yrs and older Afluria) 10/27/2017    Influenza, Quadv, Recombinant, IM PF (Flublok 18 yrs and older) 10/29/2019    Pneumococcal Polysaccharide ([id number]) 06/06/2014    Tdap (Boostrix, Adacel) 04/05/2012    Varicella (Varivax) 06/20/2017    Zoster Live (Zostavax) 06/20/2017    Zoster Recombinant (Shingrix) 07/15/2020, 12/28/2020   ,   Health Maintenance   Topic Date Due    Diabetic foot exam  01/14/2021    A1C test (Diabetic or Prediabetic)  07/08/2021    Lipid screen  07/08/2021    Potassium monitoring  07/08/2021    Creatinine monitoring  07/08/2021  Diabetic microalbuminuria test  07/14/2021    DTaP/Tdap/Td vaccine (2 - Td) 04/05/2022    Diabetic retinal exam  07/08/2022    Colon cancer screen colonoscopy  09/30/2030    Flu vaccine  Completed    Shingles Vaccine  Completed    Pneumococcal 0-64 years Vaccine  Completed    Hepatitis C screen  Completed    HIV screen  Completed    Hepatitis A vaccine  Aged Out    Hib vaccine  Aged Out    Meningococcal (ACWY) vaccine  Aged Out       PHYSICAL EXAMINATION:  [ INSTRUCTIONS:  \"[x]\" Indicates a positive item  \"[]\" Indicates a negative item  -- DELETE ALL ITEMS NOT EXAMINED]  Vital Signs: (As obtained by patient/caregiver or practitioner observation)    Blood pressure-122/78(pt)  Heart rate- na   Respiratory rate- 14(observed)   Temperature- na Pulse oximetry- na    Constitutional: [x] Appears well-developed and well-nourished [x] No apparent distress      [] Abnormal-   Mental status  [x] Alert and awake  [x] Oriented to person/place/time [x]Able to follow commands      Eyes:  EOM    [x]  Normal  [] Abnormal-  Sclera  [x]  Normal  [] Abnormal -         Discharge [x]  None visible  [] Abnormal -    HENT:   [x] Normocephalic, atraumatic.   [] Abnormal   [x] Mouth/Throat: Mucous membranes are moist.     External Ears [x] Normal  [] Abnormal-     Neck: [x] No visualized mass     Pulmonary/Chest: [x] Respiratory effort normal.  [x] No visualized signs of difficulty breathing or respiratory distress        [] Abnormal-      Musculoskeletal:   [x] Normal gait with no signs of ataxia         [x] Normal range of motion of neck        [] Abnormal-       Neurological:        [x] No Facial Asymmetry (Cranial nerve 7 motor function) (limited exam to video visit)          [x] No gaze palsy        [] Abnormal-         Skin:        [x] No significant exanthematous lesions or discoloration noted on facial skin         [] Abnormal-            Psychiatric:       [x] Normal Affect [x] No Hallucinations        [] Abnormal- Other pertinent observable physical exam findings-     ASSESSMENT/PLAN:  1. Controlled type 2 diabetes mellitus without complication, without long-term current use of insulin ([address]  To continue to improve diet and hopefully lose a few lbs. To continue to improve exercise as tolerated. 2. Essential hypertension  Continue present meds. Home BP checks. Call if>140/90. Improve diet. Avoid caffeine and salt. Call if new c/o w/ meds. 3. Pure hypercholesterolemia  Continue med. Labs next visit. Diet and exercise as tolerated. 4. History of prostate cancer  No c/o at present. To call if newc/o. To Uro as scheduled or if new c/o. Appt for H&P in 6 months. Cruz Oconnell is a 60 y.o. male being evaluated by a Virtual Visit (video visit) encounter to address concerns as mentioned above. A caregiver was present when appropriate. Due to this being a TeleHealth encounter (During JUUVK-56 public health emergency), evaluation of the following organ systems was limited: Vitals/Constitutional/EENT/Resp/CV/GI//MS/Neuro/Skin/Heme-Lymph-Imm. Pursuant to the emergency declaration under the [facility], [address] [place] [address] authority and the [employer] Act, this Virtual Visit was conducted with patient's (and/or legal guardian's) consent, to reduce the patient's risk of exposure to COVID-19 and provide necessary medical care. The patient (and/or legal guardian) has also been advised to contact this office for worsening conditions or problems, and seek emergency medical treatment and/or call 911 if deemed necessary. Patient identification was verified at the start of the visit: Yes    Total time spent on this encounter: Not billed by time    Services were provided through a video synchronous discussion virtually to substitute for in-person clinic visit. Patient and provider were located at their individual homes. --Cuco Nick MD on 1/21/2021 at 12:27 PM    An electronic signature was used to authenticate this note.

## 2021-01-21 NOTE — ASSESSMENT & PLAN NOTE
Sugars are good( w/ last HbA1c of 5.9%), diet is stable, weight is stable, no reported neuropathy, no change in vision, no claudication, no foot ulcers, no new skin lesions. No change in medications. No c/o with meds. Last eye exam 7/2020.

## 2021-01-21 NOTE — ASSESSMENT & PLAN NOTE
No change in meds, no c/o with meds, no chest pain, SOB, palpatations, or syncope.  Home bp was good.(122/78 today pt taken at home)

## 2021-07-13 ENCOUNTER — TELEPHONE (OUTPATIENT)
Dept: FAMILY MEDICINE CLINIC | Age: 66
End: 2021-07-13

## 2021-07-13 DIAGNOSIS — E11.9 CONTROLLED TYPE 2 DIABETES MELLITUS WITHOUT COMPLICATION, WITHOUT LONG-TERM CURRENT USE OF INSULIN (HCC): ICD-10-CM

## 2021-07-13 DIAGNOSIS — Z00.00 ANNUAL PHYSICAL EXAM: Primary | ICD-10-CM

## 2021-07-13 DIAGNOSIS — Z12.5 SCREENING PSA (PROSTATE SPECIFIC ANTIGEN): ICD-10-CM

## 2021-07-13 NOTE — TELEPHONE ENCOUNTER
----- Message from Bernardo Guzman sent at 7/13/2021 10:00 AM EDT -----  Subject: Message to Provider    QUESTIONS  Information for Provider? Patient needs bloodwork faxed over to faxed over   to [facility], in [employer], today or tomorrow morning. Fax Number is   [phone]. He said that [facility] usually does it for him.  ---------------------------------------------------------------------------  --------------  CALL BACK INFO  What is the best way for the office to contact you? OK to leave message on   voicemail  Preferred Call Back Phone Number? [phone]  ---------------------------------------------------------------------------  --------------  SCRIPT ANSWERS  Relationship to Patient?  Self

## 2021-07-22 ENCOUNTER — OFFICE VISIT (OUTPATIENT)
Dept: FAMILY MEDICINE CLINIC | Age: 66
End: 2021-07-22
Payer: COMMERCIAL

## 2021-07-22 VITALS
WEIGHT: 244 LBS | OXYGEN SATURATION: 98 % | BODY MASS INDEX: 30.34 KG/M2 | SYSTOLIC BLOOD PRESSURE: 122 MMHG | DIASTOLIC BLOOD PRESSURE: 70 MMHG | HEART RATE: 79 BPM | HEIGHT: 75 IN | RESPIRATION RATE: 18 BRPM

## 2021-07-22 DIAGNOSIS — Z85.46 HISTORY OF PROSTATE CANCER: ICD-10-CM

## 2021-07-22 DIAGNOSIS — G89.29 CHRONIC PAIN OF LEFT KNEE: ICD-10-CM

## 2021-07-22 DIAGNOSIS — I10 ESSENTIAL HYPERTENSION: ICD-10-CM

## 2021-07-22 DIAGNOSIS — E78.00 PURE HYPERCHOLESTEROLEMIA: ICD-10-CM

## 2021-07-22 DIAGNOSIS — E11.9 CONTROLLED TYPE 2 DIABETES MELLITUS WITHOUT COMPLICATION, WITHOUT LONG-TERM CURRENT USE OF INSULIN (HCC): ICD-10-CM

## 2021-07-22 DIAGNOSIS — Z00.00 ANNUAL PHYSICAL EXAM: ICD-10-CM

## 2021-07-22 DIAGNOSIS — M25.562 CHRONIC PAIN OF LEFT KNEE: ICD-10-CM

## 2021-07-22 LAB
CREATININE URINE: 101.8 MG/DL (ref 39–259)
MICROALBUMIN UR-MCNC: <1.2 MG/DL
MICROALBUMIN/CREAT UR-RTO: NORMAL MG/G (ref 0–30)

## 2021-07-22 PROCEDURE — 99396 PREV VISIT EST AGE 40-64: CPT | Performed by: INTERNAL MEDICINE

## 2021-07-22 RX ORDER — LISINOPRIL 20 MG/1
20 TABLET ORAL DAILY
Qty: 90 TABLET | Refills: 3 | Status: SHIPPED | OUTPATIENT
Start: 2021-07-22 | End: 2022-07-27 | Stop reason: SDUPTHER

## 2021-07-22 RX ORDER — PRAVASTATIN SODIUM 20 MG
20 TABLET ORAL DAILY
Qty: 90 TABLET | Refills: 3 | Status: SHIPPED | OUTPATIENT
Start: 2021-07-22 | End: 2022-07-27 | Stop reason: SDUPTHER

## 2021-07-22 ASSESSMENT — ENCOUNTER SYMPTOMS
RESPIRATORY NEGATIVE: 1
GASTROINTESTINAL NEGATIVE: 1
EYES NEGATIVE: 1
ALLERGIC/IMMUNOLOGIC NEGATIVE: 1

## 2021-07-22 NOTE — PATIENT INSTRUCTIONS
Prostate: today  Colonoscopy: 9/2020. rechx 5 yrs. DEXA: na  Eye: 7/2021  Hearing: passed in office exam  Immunization: flu shot in the fall. Prevnar 15 after age 72.    MMSE: na  Get Up and Go: na  Tob: nonsmoker/never  ETOH: 3 glasses wine/night  Caffeine: low  Cardiac Risk Assessment: 12% cardiac risk by [place].  Living will: yes  Medical power of : yes

## 2021-07-22 NOTE — ASSESSMENT & PLAN NOTE
Prostate: today  Colonoscopy: 9/2020. rechx 5 yrs. DEXA: na  Eye: 7/2021  Hearing: passed in office exam  Immunization: flu shot in the fall. Prevnar 15 after age 73.    MMSE: na  Get Up and Go: na  Tob: nonsmoker/never  ETOH: 3 glasses wine/night  Caffeine: low  Cardiac Risk Assessment: 12% cardiac risk by [place].  Living will: yes  Medical power of : yes

## 2021-07-22 NOTE — PROGRESS NOTES
Venita Estrada presents today for   Chief Complaint   Patient presents with    Annual Exam      Annual physical exam  Prostate: today  Colonoscopy: 9/2020. rechx 5 yrs. DEXA: na  Eye: 7/2021  Hearing: passed in office exam  Immunization: flu shot in the fall. Prevnar 15 after age 73. MMSE: na  Get Up and Go: na  Tob: nonsmoker/never  ETOH: 3 glasses wine/night  Caffeine: low  Cardiac Risk Assessment: 12% cardiac risk by [place].  Living will: yes  Medical power of : yes    Controlled type 2 diabetes mellitus without complication, without long-term current use of insulin ([facility])  Sugars are good( w/ last HbA1c of 5.6%), diet is stable, weight is stable, no reported neuropathy, no change in vision, no claudication, no foot ulcers, no new skin lesions. No change in medications. No c/o with meds. Last eye exam 7/2021. Essential hypertension  No change in meds, no c/o with meds, no chest pain, SOB, palpatations, or syncope. Home bp was good.(122/78 today pt taken at home)    Pure hypercholesterolemia  Stable diet and wt. Taking meds consistently. Last Labs good. History of prostate cancer  Doing well w/o c/o. DX in 2006. tx w/ prostatectomy then later tx w/ radiation tx. Saw Dr Silvino Kelly in 6/2021. Labs were ok  PSA stable to 1.8    Left knee pain   Much imroved. No new c/o. Review of Systems   Constitutional: Positive for fatigue. HENT: Negative. Eyes: Negative. Respiratory: Negative. Cardiovascular: Negative. Gastrointestinal: Negative. Endocrine: Negative. Genitourinary: Negative. Musculoskeletal: Positive for arthralgias. Skin: Negative. Allergic/Immunologic: Negative. Neurological: Negative. Hematological: Negative. Psychiatric/Behavioral: Negative.             Vitals:    07/22/21 1132 07/22/21 1214   BP: 112/80 122/70   Pulse: 79    Resp: 18    SpO2: 98%    Weight: 244 lb (110.7 kg)    Height: 6' 3\" (1.905 m)          Physical Exam  Constitutional: General: He is not in acute distress. Appearance: Normal appearance. He is well-developed. He is not ill-appearing, toxic-appearing or diaphoretic. HENT:      Head: Normocephalic and atraumatic. Right Ear: Hearing, tympanic membrane, ear canal and external ear normal. There is no impacted cerumen. Left Ear: Hearing, tympanic membrane, ear canal and external ear normal. There is no impacted cerumen. Nose: Nose normal. No congestion or rhinorrhea. Right Sinus: No maxillary sinus tenderness or frontal sinus tenderness. Left Sinus: No maxillary sinus tenderness or frontal sinus tenderness. Mouth/Throat:      Pharynx: Uvula midline. No oropharyngeal exudate or posterior oropharyngeal erythema. Eyes:      General: Lids are normal. No scleral icterus. Right eye: No discharge. Left eye: No discharge. Conjunctiva/sclera: Conjunctivae normal.      Pupils: Pupils are equal, round, and reactive to light. Funduscopic exam:     Right eye: No hemorrhage, exudate, AV nicking, arteriolar narrowing or papilledema. Left eye: No hemorrhage, exudate, AV nicking, arteriolar narrowing or papilledema. Neck:      Thyroid: No thyroid mass or thyromegaly. Vascular: Normal carotid pulses. No carotid bruit, hepatojugular reflux or JVD. Trachea: Trachea normal. No tracheal deviation. Cardiovascular:      Rate and Rhythm: Normal rate and regular rhythm. No extrasystoles are present. Chest Wall: PMI is not displaced. Pulses: Normal pulses. No decreased pulses. Carotid pulses are 2+ on the right side and 2+ on the left side. Radial pulses are 2+ on the right side and 2+ on the left side. Femoral pulses are 2+ on the right side and 2+ on the left side. Popliteal pulses are 2+ on the right side and 2+ on the left side. Dorsalis pedis pulses are 2+ on the right side and 2+ on the left side.         Posterior tibial pulses are 2+ on the right side and 2+ on the left side. Heart sounds: Normal heart sounds. No murmur heard. No friction rub. No gallop. Pulmonary:      Effort: Pulmonary effort is normal. No tachypnea, bradypnea, accessory muscle usage or respiratory distress. Breath sounds: Normal breath sounds. No stridor. No decreased breath sounds, wheezing, rhonchi or rales. Chest:      Chest wall: No tenderness. Abdominal:      General: Bowel sounds are normal. There is no distension or abdominal bruit. Palpations: Abdomen is soft. There is no shifting dullness, fluid wave, mass or pulsatile mass. Tenderness: There is no abdominal tenderness. There is no guarding or rebound. Hernia: No hernia is present. There is no hernia in the ventral area or left inguinal area. Genitourinary:     Penis: Normal. No tenderness. Testes: Normal. Cremasteric reflex is present. Prostate: Normal.      Rectum: Normal. Guaiac result negative. Musculoskeletal:         General: No tenderness. Normal range of motion. Cervical back: Full passive range of motion without pain, normal range of motion and neck supple. No rigidity or tenderness. Right lower leg: No edema. Left lower leg: No edema. Lymphadenopathy:      Head:      Right side of head: No submental, submandibular, tonsillar, preauricular, posterior auricular or occipital adenopathy. Left side of head: No submental, submandibular, tonsillar, preauricular, posterior auricular or occipital adenopathy. Cervical: No cervical adenopathy. Upper Body:      Right upper body: No supraclavicular or epitrochlear adenopathy. Left upper body: No supraclavicular or epitrochlear adenopathy. Skin:     General: Skin is warm and dry. Capillary Refill: Capillary refill takes less than 2 seconds. Coloration: Skin is not jaundiced or pale. Findings: No abrasion, bruising, erythema, lesion or rash.       Nails: There is no clubbing. Neurological:      General: No focal deficit present. Mental Status: He is alert and oriented to person, place, and time. Mental status is at baseline. He is not disoriented. Cranial Nerves: No cranial nerve deficit. Sensory: No sensory deficit. Motor: No weakness, tremor, atrophy, abnormal muscle tone or seizure activity. Coordination: Coordination normal.      Gait: Gait normal.      Deep Tendon Reflexes: Reflexes are normal and symmetric. Reflexes normal. Babinski sign absent on the right side. Babinski sign absent on the left side. Reflex Scores:       Tricep reflexes are 2+ on the right side and 2+ on the left side. Bicep reflexes are 2+ on the right side and 2+ on the left side. Brachioradialis reflexes are 2+ on the right side and 2+ on the left side. Patellar reflexes are 2+ on the right side and 2+ on the left side. Achilles reflexes are 2+ on the right side and 2+ on the left side. Comments: Diabetic foot exam was normal with intact light touch and vibratory senses. Psychiatric:         Mood and Affect: Mood normal.         Speech: Speech normal.         Behavior: Behavior normal.         Thought Content: Thought content normal.         Judgment: Judgment normal.          Problem     Annual Physical Exam. Prevnar 15 at age 73. Controlled Type 2 Diabetes Mellitus Without Complication, Without Long-Term Current Use of Insulin (H[facility]). Will do labs and adjust meds after results are evaluated. To continue to improve diet and lose weight. To follow Diabetic diet. If needs further help w/ Diabetic diet to call and will refer back to dietician. Home sugar checks. To call if sudden change up or down in sugars. To do regular foot checks. Call if new problems. Essential Hypertension. Continue present meds. Home BP checks. Call if>140/90. Improve diet. Avoid caffeine and salt. Call if new c/o w/ meds.      Pure Hypercholesterolemia. To continue to improve diet and exercise. Continue meds. Call if new c/o. History of Prostate Cancer. Now 15+ yrs. No new c/o. Sees Uro as scheduled. Left Knee Pain. No new c/o.                 Christa Waller MD

## 2021-07-22 NOTE — ASSESSMENT & PLAN NOTE
Doing well w/o c/o. DX in 2006. tx w/ prostatectomy then later tx w/ radiation tx. Saw Dr Christiane West in 6/2021.  Labs were ok  PSA stable to 1.8

## 2021-07-22 NOTE — ASSESSMENT & PLAN NOTE
Sugars are good( w/ last HbA1c of 5.6%), diet is stable, weight is stable, no reported neuropathy, no change in vision, no claudication, no foot ulcers, no new skin lesions. No change in medications. No c/o with meds. Last eye exam 7/2021.

## 2021-10-13 ENCOUNTER — TELEPHONE (OUTPATIENT)
Dept: FAMILY MEDICINE CLINIC | Age: 66
End: 2021-10-13

## 2021-10-13 NOTE — TELEPHONE ENCOUNTER
----- Message from Nicole Anaya sent at 10/12/2021  2:55 PM EDT -----  Subject: Appointment Request    Reason for Call: New Patient Request Appointment    QUESTIONS  Type of Appointment? New Patient/New to Provider  Reason for appointment request? No appointments available during search  Additional Information for Provider? Pt would like to establish care with   someone a male provider closer to his age, would like a call back. ---------------------------------------------------------------------------  --------------  Radha SANCHEZ  What is the best way for the office to contact you? OK to leave message on   voicemail  Preferred Call Back Phone Number? [phone]  ---------------------------------------------------------------------------  --------------  SCRIPT ANSWERS  Relationship to Patient? Self  Specialty Confirmation? Primary Care  Is this the first appointment to establish care for a ? No  Have you been diagnosed with, awaiting test results for, or told that you   are suspected of having COVID-19 (Coronavirus)? (If patient has tested   negative or was tested as a requirement for work, school, or travel and   not based on symptoms, answer no)? No  Within the past two weeks have you developed any of the following symptoms   (answer no if symptoms have been present longer than 2 weeks or began   more than 2 weeks ago)? Fever or Chills, Cough, Shortness of breath or   difficulty breathing, Loss of taste or smell, Sore throat, Nasal   congestion, Sneezing or runny nose, Fatigue or generalized body aches   (answer no if pain is specific to a body part e.g. back pain), Diarrhea,   Headache? No  Have you had close contact with someone with COVID-19 in the last 14 days? No  (Service Expert  click yes below to proceed with [employer] As Usual   Scheduling)?  Yes

## 2021-12-08 ENCOUNTER — ANESTHESIA (OUTPATIENT)
Dept: OPERATING ROOM | Age: 66
DRG: 355 | End: 2021-12-08
Payer: MEDICARE

## 2021-12-08 ENCOUNTER — HOSPITAL ENCOUNTER (INPATIENT)
Age: 66
LOS: 4 days | Discharge: HOME | DRG: 355 | End: 2021-12-12
Attending: EMERGENCY MEDICINE | Admitting: SURGERY
Payer: MEDICARE

## 2021-12-08 ENCOUNTER — APPOINTMENT (OUTPATIENT)
Dept: GENERAL RADIOLOGY | Age: 66
DRG: 355 | End: 2021-12-08
Payer: MEDICARE

## 2021-12-08 ENCOUNTER — ANESTHESIA EVENT (OUTPATIENT)
Dept: OPERATING ROOM | Age: 66
DRG: 355 | End: 2021-12-08
Payer: MEDICARE

## 2021-12-08 VITALS — SYSTOLIC BLOOD PRESSURE: 160 MMHG | TEMPERATURE: 98.2 F | OXYGEN SATURATION: 92 % | DIASTOLIC BLOOD PRESSURE: 118 MMHG

## 2021-12-08 DIAGNOSIS — K42.0 INCARCERATED UMBILICAL HERNIA: Primary | ICD-10-CM

## 2021-12-08 PROBLEM — K46.0 INCARCERATED HERNIA: Status: ACTIVE | Noted: 2021-12-08

## 2021-12-08 LAB
ABO/RH: NORMAL
ALBUMIN SERPL-MCNC: 4.7 G/DL (ref 3.4–5)
ALP BLD-CCNC: 71 U/L (ref 40–129)
ALT SERPL-CCNC: 21 U/L (ref 10–40)
AMORPHOUS: ABNORMAL /HPF
ANION GAP SERPL CALCULATED.3IONS-SCNC: 18 MMOL/L (ref 3–16)
ANTIBODY SCREEN: NORMAL
AST SERPL-CCNC: 21 U/L (ref 15–37)
BACTERIA: ABNORMAL /HPF
BASOPHILS ABSOLUTE: 0.1 K/UL (ref 0–0.2)
BASOPHILS RELATIVE PERCENT: 0.5 %
BILIRUB SERPL-MCNC: 0.9 MG/DL (ref 0–1)
BILIRUBIN DIRECT: <0.2 MG/DL (ref 0–0.3)
BILIRUBIN URINE: ABNORMAL
BILIRUBIN, INDIRECT: NORMAL MG/DL (ref 0–1)
BLOOD, URINE: NEGATIVE
BUN BLDV-MCNC: 15 MG/DL (ref 7–20)
CALCIUM SERPL-MCNC: 10.6 MG/DL (ref 8.3–10.6)
CHLORIDE BLD-SCNC: 95 MMOL/L (ref 99–110)
CLARITY: ABNORMAL
CO2: 22 MMOL/L (ref 21–32)
COLOR: YELLOW
CREAT SERPL-MCNC: 0.8 MG/DL (ref 0.8–1.3)
EKG ATRIAL RATE: 103 BPM
EKG DIAGNOSIS: NORMAL
EKG P AXIS: 41 DEGREES
EKG P-R INTERVAL: 166 MS
EKG Q-T INTERVAL: 360 MS
EKG QRS DURATION: 94 MS
EKG QTC CALCULATION (BAZETT): 471 MS
EKG R AXIS: -16 DEGREES
EKG T AXIS: 50 DEGREES
EKG VENTRICULAR RATE: 103 BPM
EOSINOPHILS ABSOLUTE: 0.1 K/UL (ref 0–0.6)
EOSINOPHILS RELATIVE PERCENT: 1 %
GFR AFRICAN AMERICAN: >60
GFR NON-AFRICAN AMERICAN: >60
GLUCOSE BLD-MCNC: 132 MG/DL (ref 70–99)
GLUCOSE BLD-MCNC: 152 MG/DL (ref 70–99)
GLUCOSE BLD-MCNC: 201 MG/DL (ref 70–99)
GLUCOSE URINE: NEGATIVE MG/DL
HCT VFR BLD CALC: 46.9 % (ref 40.5–52.5)
HEMOGLOBIN: 16.1 G/DL (ref 13.5–17.5)
HYALINE CASTS: ABNORMAL /LPF (ref 0–2)
INR BLD: 1 (ref 0.88–1.12)
KETONES, URINE: 40 MG/DL
LEUKOCYTE ESTERASE, URINE: NEGATIVE
LIPASE: 29 U/L (ref 13–60)
LYMPHOCYTES ABSOLUTE: 1.7 K/UL (ref 1–5.1)
LYMPHOCYTES RELATIVE PERCENT: 14.1 %
MCH RBC QN AUTO: 32.7 PG (ref 26–34)
MCHC RBC AUTO-ENTMCNC: 34.4 G/DL (ref 31–36)
MCV RBC AUTO: 95.1 FL (ref 80–100)
MICROSCOPIC EXAMINATION: YES
MONOCYTES ABSOLUTE: 0.8 K/UL (ref 0–1.3)
MONOCYTES RELATIVE PERCENT: 6.4 %
MUCUS: ABNORMAL /LPF
NEUTROPHILS ABSOLUTE: 9.3 K/UL (ref 1.7–7.7)
NEUTROPHILS RELATIVE PERCENT: 78 %
NITRITE, URINE: NEGATIVE
PDW BLD-RTO: 13.1 % (ref 12.4–15.4)
PERFORMED ON: ABNORMAL
PERFORMED ON: ABNORMAL
PH UA: 7.5 (ref 5–8)
PLATELET # BLD: 225 K/UL (ref 135–450)
PMV BLD AUTO: 8.1 FL (ref 5–10.5)
POTASSIUM REFLEX MAGNESIUM: 4.3 MMOL/L (ref 3.5–5.1)
PRO-BNP: 74 PG/ML (ref 0–124)
PROTEIN UA: 30 MG/DL
PROTHROMBIN TIME: 11.3 SEC (ref 9.9–12.7)
RBC # BLD: 4.94 M/UL (ref 4.2–5.9)
RBC UA: ABNORMAL /HPF (ref 0–4)
SODIUM BLD-SCNC: 135 MMOL/L (ref 136–145)
SPECIFIC GRAVITY UA: 1.02 (ref 1–1.03)
TOTAL PROTEIN: 7.6 G/DL (ref 6.4–8.2)
TROPONIN: <0.01 NG/ML
URINE TYPE: ABNORMAL
UROBILINOGEN, URINE: 0.2 E.U./DL
WBC # BLD: 12 K/UL (ref 4–11)
WBC UA: ABNORMAL /HPF (ref 0–5)

## 2021-12-08 PROCEDURE — 49561 PR REPAIR INCISIONAL HERNIA,STRANG: CPT | Performed by: SURGERY

## 2021-12-08 PROCEDURE — 84484 ASSAY OF TROPONIN QUANT: CPT

## 2021-12-08 PROCEDURE — 2580000003 HC RX 258: Performed by: STUDENT IN AN ORGANIZED HEALTH CARE EDUCATION/TRAINING PROGRAM

## 2021-12-08 PROCEDURE — 83690 ASSAY OF LIPASE: CPT

## 2021-12-08 PROCEDURE — 2500000003 HC RX 250 WO HCPCS: Performed by: NURSE ANESTHETIST, CERTIFIED REGISTERED

## 2021-12-08 PROCEDURE — 3700000001 HC ADD 15 MINUTES (ANESTHESIA): Performed by: SURGERY

## 2021-12-08 PROCEDURE — 85610 PROTHROMBIN TIME: CPT

## 2021-12-08 PROCEDURE — 6370000000 HC RX 637 (ALT 250 FOR IP): Performed by: STUDENT IN AN ORGANIZED HEALTH CARE EDUCATION/TRAINING PROGRAM

## 2021-12-08 PROCEDURE — 83880 ASSAY OF NATRIURETIC PEPTIDE: CPT

## 2021-12-08 PROCEDURE — 1200000000 HC SEMI PRIVATE

## 2021-12-08 PROCEDURE — 2709999900 HC NON-CHARGEABLE SUPPLY: Performed by: SURGERY

## 2021-12-08 PROCEDURE — 81001 URINALYSIS AUTO W/SCOPE: CPT

## 2021-12-08 PROCEDURE — 0WUF0JZ SUPPLEMENT ABDOMINAL WALL WITH SYNTHETIC SUBSTITUTE, OPEN APPROACH: ICD-10-PCS | Performed by: SURGERY

## 2021-12-08 PROCEDURE — 96375 TX/PRO/DX INJ NEW DRUG ADDON: CPT

## 2021-12-08 PROCEDURE — 6360000002 HC RX W HCPCS: Performed by: STUDENT IN AN ORGANIZED HEALTH CARE EDUCATION/TRAINING PROGRAM

## 2021-12-08 PROCEDURE — 6360000002 HC RX W HCPCS: Performed by: ANESTHESIOLOGY

## 2021-12-08 PROCEDURE — 49568 PR IMPLANT MESH HERNIA REPAIR/DEBRIDEMENT CLOSURE: CPT | Performed by: SURGERY

## 2021-12-08 PROCEDURE — 3600000004 HC SURGERY LEVEL 4 BASE: Performed by: SURGERY

## 2021-12-08 PROCEDURE — 74022 RADEX COMPL AQT ABD SERIES: CPT

## 2021-12-08 PROCEDURE — 3600000014 HC SURGERY LEVEL 4 ADDTL 15MIN: Performed by: SURGERY

## 2021-12-08 PROCEDURE — 80048 BASIC METABOLIC PNL TOTAL CA: CPT

## 2021-12-08 PROCEDURE — 6360000002 HC RX W HCPCS: Performed by: NURSE ANESTHETIST, CERTIFIED REGISTERED

## 2021-12-08 PROCEDURE — 2580000003 HC RX 258: Performed by: PHYSICIAN ASSISTANT

## 2021-12-08 PROCEDURE — 99223 1ST HOSP IP/OBS HIGH 75: CPT | Performed by: SURGERY

## 2021-12-08 PROCEDURE — 88302 TISSUE EXAM BY PATHOLOGIST: CPT

## 2021-12-08 PROCEDURE — 93005 ELECTROCARDIOGRAM TRACING: CPT | Performed by: PHYSICIAN ASSISTANT

## 2021-12-08 PROCEDURE — 2500000003 HC RX 250 WO HCPCS: Performed by: SURGERY

## 2021-12-08 PROCEDURE — 2720000010 HC SURG SUPPLY STERILE: Performed by: SURGERY

## 2021-12-08 PROCEDURE — 2580000003 HC RX 258: Performed by: SURGERY

## 2021-12-08 PROCEDURE — 7100000000 HC PACU RECOVERY - FIRST 15 MIN: Performed by: SURGERY

## 2021-12-08 PROCEDURE — 86900 BLOOD TYPING SEROLOGIC ABO: CPT

## 2021-12-08 PROCEDURE — 96374 THER/PROPH/DIAG INJ IV PUSH: CPT

## 2021-12-08 PROCEDURE — 85025 COMPLETE CBC W/AUTO DIFF WBC: CPT

## 2021-12-08 PROCEDURE — C1781 MESH (IMPLANTABLE): HCPCS | Performed by: SURGERY

## 2021-12-08 PROCEDURE — 2500000003 HC RX 250 WO HCPCS: Performed by: STUDENT IN AN ORGANIZED HEALTH CARE EDUCATION/TRAINING PROGRAM

## 2021-12-08 PROCEDURE — 36415 COLL VENOUS BLD VENIPUNCTURE: CPT

## 2021-12-08 PROCEDURE — 80076 HEPATIC FUNCTION PANEL: CPT

## 2021-12-08 PROCEDURE — 86850 RBC ANTIBODY SCREEN: CPT

## 2021-12-08 PROCEDURE — 86901 BLOOD TYPING SEROLOGIC RH(D): CPT

## 2021-12-08 PROCEDURE — 99285 EMERGENCY DEPT VISIT HI MDM: CPT

## 2021-12-08 PROCEDURE — 7100000001 HC PACU RECOVERY - ADDTL 15 MIN: Performed by: SURGERY

## 2021-12-08 PROCEDURE — 3700000000 HC ANESTHESIA ATTENDED CARE: Performed by: SURGERY

## 2021-12-08 PROCEDURE — 6360000002 HC RX W HCPCS: Performed by: PHYSICIAN ASSISTANT

## 2021-12-08 DEVICE — MESH SURG W9XL15CM ABSRB BIO-A: Type: IMPLANTABLE DEVICE | Site: GROIN | Status: FUNCTIONAL

## 2021-12-08 RX ORDER — DEXAMETHASONE SODIUM PHOSPHATE 4 MG/ML
INJECTION, SOLUTION INTRA-ARTICULAR; INTRALESIONAL; INTRAMUSCULAR; INTRAVENOUS; SOFT TISSUE PRN
Status: DISCONTINUED | OUTPATIENT
Start: 2021-12-08 | End: 2021-12-08 | Stop reason: SDUPTHER

## 2021-12-08 RX ORDER — FENTANYL CITRATE 50 UG/ML
25 INJECTION, SOLUTION INTRAMUSCULAR; INTRAVENOUS EVERY 5 MIN PRN
Status: DISCONTINUED | OUTPATIENT
Start: 2021-12-08 | End: 2021-12-08 | Stop reason: HOSPADM

## 2021-12-08 RX ORDER — LIDOCAINE HYDROCHLORIDE 20 MG/ML
INJECTION, SOLUTION EPIDURAL; INFILTRATION; INTRACAUDAL; PERINEURAL PRN
Status: DISCONTINUED | OUTPATIENT
Start: 2021-12-08 | End: 2021-12-08 | Stop reason: SDUPTHER

## 2021-12-08 RX ORDER — DEXTROSE MONOHYDRATE 25 G/50ML
12.5 INJECTION, SOLUTION INTRAVENOUS PRN
Status: DISCONTINUED | OUTPATIENT
Start: 2021-12-08 | End: 2021-12-12 | Stop reason: HOSPADM

## 2021-12-08 RX ORDER — DEXTROSE MONOHYDRATE 25 G/50ML
12.5 INJECTION, SOLUTION INTRAVENOUS PRN
Status: DISCONTINUED | OUTPATIENT
Start: 2021-12-08 | End: 2021-12-09 | Stop reason: SDUPTHER

## 2021-12-08 RX ORDER — PRAVASTATIN SODIUM 20 MG
20 TABLET ORAL DAILY
Status: DISCONTINUED | OUTPATIENT
Start: 2021-12-09 | End: 2021-12-12 | Stop reason: HOSPADM

## 2021-12-08 RX ORDER — LABETALOL HYDROCHLORIDE 5 MG/ML
5 INJECTION, SOLUTION INTRAVENOUS EVERY 10 MIN PRN
Status: DISCONTINUED | OUTPATIENT
Start: 2021-12-08 | End: 2021-12-08 | Stop reason: HOSPADM

## 2021-12-08 RX ORDER — INSULIN LISPRO 100 [IU]/ML
0-3 INJECTION, SOLUTION INTRAVENOUS; SUBCUTANEOUS NIGHTLY
Status: DISCONTINUED | OUTPATIENT
Start: 2021-12-09 | End: 2021-12-09

## 2021-12-08 RX ORDER — ACETAMINOPHEN 500 MG
1000 TABLET ORAL EVERY 8 HOURS SCHEDULED
Status: DISCONTINUED | OUTPATIENT
Start: 2021-12-08 | End: 2021-12-12 | Stop reason: HOSPADM

## 2021-12-08 RX ORDER — FENTANYL CITRATE 50 UG/ML
INJECTION, SOLUTION INTRAMUSCULAR; INTRAVENOUS PRN
Status: DISCONTINUED | OUTPATIENT
Start: 2021-12-08 | End: 2021-12-08 | Stop reason: SDUPTHER

## 2021-12-08 RX ORDER — SODIUM CHLORIDE 9 MG/ML
25 INJECTION, SOLUTION INTRAVENOUS PRN
Status: DISCONTINUED | OUTPATIENT
Start: 2021-12-08 | End: 2021-12-12 | Stop reason: HOSPADM

## 2021-12-08 RX ORDER — SODIUM CHLORIDE, SODIUM LACTATE, POTASSIUM CHLORIDE, CALCIUM CHLORIDE 600; 310; 30; 20 MG/100ML; MG/100ML; MG/100ML; MG/100ML
INJECTION, SOLUTION INTRAVENOUS CONTINUOUS
Status: DISCONTINUED | OUTPATIENT
Start: 2021-12-08 | End: 2021-12-09

## 2021-12-08 RX ORDER — NICOTINE POLACRILEX 4 MG
15 LOZENGE BUCCAL PRN
Status: DISCONTINUED | OUTPATIENT
Start: 2021-12-08 | End: 2021-12-12 | Stop reason: HOSPADM

## 2021-12-08 RX ORDER — MEPERIDINE HYDROCHLORIDE 25 MG/ML
12.5 INJECTION INTRAMUSCULAR; INTRAVENOUS; SUBCUTANEOUS EVERY 5 MIN PRN
Status: DISCONTINUED | OUTPATIENT
Start: 2021-12-08 | End: 2021-12-08 | Stop reason: HOSPADM

## 2021-12-08 RX ORDER — PROMETHAZINE HYDROCHLORIDE 25 MG/ML
12.5 INJECTION, SOLUTION INTRAMUSCULAR; INTRAVENOUS ONCE
Status: COMPLETED | OUTPATIENT
Start: 2021-12-08 | End: 2021-12-08

## 2021-12-08 RX ORDER — SODIUM CHLORIDE 0.9 % (FLUSH) 0.9 %
5-40 SYRINGE (ML) INJECTION EVERY 12 HOURS SCHEDULED
Status: DISCONTINUED | OUTPATIENT
Start: 2021-12-08 | End: 2021-12-12 | Stop reason: HOSPADM

## 2021-12-08 RX ORDER — OXYCODONE HYDROCHLORIDE 5 MG/1
5 TABLET ORAL EVERY 4 HOURS PRN
Status: DISCONTINUED | OUTPATIENT
Start: 2021-12-08 | End: 2021-12-12 | Stop reason: HOSPADM

## 2021-12-08 RX ORDER — BUPIVACAINE HYDROCHLORIDE 5 MG/ML
INJECTION, SOLUTION EPIDURAL; INTRACAUDAL PRN
Status: DISCONTINUED | OUTPATIENT
Start: 2021-12-08 | End: 2021-12-08 | Stop reason: ALTCHOICE

## 2021-12-08 RX ORDER — OXYCODONE HYDROCHLORIDE AND ACETAMINOPHEN 5; 325 MG/1; MG/1
1 TABLET ORAL
Status: DISCONTINUED | OUTPATIENT
Start: 2021-12-08 | End: 2021-12-08 | Stop reason: HOSPADM

## 2021-12-08 RX ORDER — ONDANSETRON 2 MG/ML
4 INJECTION INTRAMUSCULAR; INTRAVENOUS
Status: DISCONTINUED | OUTPATIENT
Start: 2021-12-08 | End: 2021-12-08 | Stop reason: HOSPADM

## 2021-12-08 RX ORDER — SODIUM CHLORIDE 0.9 % (FLUSH) 0.9 %
5-40 SYRINGE (ML) INJECTION PRN
Status: DISCONTINUED | OUTPATIENT
Start: 2021-12-08 | End: 2021-12-12 | Stop reason: HOSPADM

## 2021-12-08 RX ORDER — MIDAZOLAM HYDROCHLORIDE 1 MG/ML
INJECTION INTRAMUSCULAR; INTRAVENOUS
Status: COMPLETED
Start: 2021-12-08 | End: 2021-12-08

## 2021-12-08 RX ORDER — PROMETHAZINE HYDROCHLORIDE 25 MG/ML
6.25 INJECTION, SOLUTION INTRAMUSCULAR; INTRAVENOUS
Status: DISCONTINUED | OUTPATIENT
Start: 2021-12-08 | End: 2021-12-08 | Stop reason: HOSPADM

## 2021-12-08 RX ORDER — DEXTROSE MONOHYDRATE 50 MG/ML
100 INJECTION, SOLUTION INTRAVENOUS PRN
Status: DISCONTINUED | OUTPATIENT
Start: 2021-12-08 | End: 2021-12-12 | Stop reason: HOSPADM

## 2021-12-08 RX ORDER — HYDRALAZINE HYDROCHLORIDE 20 MG/ML
5 INJECTION INTRAMUSCULAR; INTRAVENOUS EVERY 10 MIN PRN
Status: DISCONTINUED | OUTPATIENT
Start: 2021-12-08 | End: 2021-12-08 | Stop reason: HOSPADM

## 2021-12-08 RX ORDER — HYDRALAZINE HYDROCHLORIDE 20 MG/ML
5 INJECTION INTRAMUSCULAR; INTRAVENOUS EVERY 6 HOURS PRN
Status: DISCONTINUED | OUTPATIENT
Start: 2021-12-08 | End: 2021-12-12 | Stop reason: HOSPADM

## 2021-12-08 RX ORDER — SODIUM CHLORIDE, SODIUM LACTATE, POTASSIUM CHLORIDE, AND CALCIUM CHLORIDE .6; .31; .03; .02 G/100ML; G/100ML; G/100ML; G/100ML
1000 INJECTION, SOLUTION INTRAVENOUS ONCE
Status: COMPLETED | OUTPATIENT
Start: 2021-12-08 | End: 2021-12-08

## 2021-12-08 RX ORDER — PROPOFOL 10 MG/ML
INJECTION, EMULSION INTRAVENOUS PRN
Status: DISCONTINUED | OUTPATIENT
Start: 2021-12-08 | End: 2021-12-08 | Stop reason: SDUPTHER

## 2021-12-08 RX ORDER — ROCURONIUM BROMIDE 10 MG/ML
INJECTION, SOLUTION INTRAVENOUS PRN
Status: DISCONTINUED | OUTPATIENT
Start: 2021-12-08 | End: 2021-12-08 | Stop reason: SDUPTHER

## 2021-12-08 RX ORDER — MIDAZOLAM HYDROCHLORIDE 1 MG/ML
INJECTION INTRAMUSCULAR; INTRAVENOUS PRN
Status: DISCONTINUED | OUTPATIENT
Start: 2021-12-08 | End: 2021-12-08 | Stop reason: SDUPTHER

## 2021-12-08 RX ORDER — MAGNESIUM HYDROXIDE 1200 MG/15ML
LIQUID ORAL CONTINUOUS PRN
Status: COMPLETED | OUTPATIENT
Start: 2021-12-08 | End: 2021-12-08

## 2021-12-08 RX ORDER — OXYCODONE HYDROCHLORIDE 5 MG/1
5 TABLET ORAL EVERY 6 HOURS PRN
Qty: 28 TABLET | Refills: 0 | Status: SHIPPED | OUTPATIENT
Start: 2021-12-08 | End: 2021-12-15

## 2021-12-08 RX ORDER — FENTANYL CITRATE 50 UG/ML
50 INJECTION, SOLUTION INTRAMUSCULAR; INTRAVENOUS EVERY 5 MIN PRN
Status: DISCONTINUED | OUTPATIENT
Start: 2021-12-08 | End: 2021-12-08 | Stop reason: HOSPADM

## 2021-12-08 RX ORDER — ONDANSETRON 2 MG/ML
4 INJECTION INTRAMUSCULAR; INTRAVENOUS ONCE
Status: COMPLETED | OUTPATIENT
Start: 2021-12-08 | End: 2021-12-08

## 2021-12-08 RX ORDER — DEXTROSE MONOHYDRATE 50 MG/ML
100 INJECTION, SOLUTION INTRAVENOUS PRN
Status: DISCONTINUED | OUTPATIENT
Start: 2021-12-08 | End: 2021-12-09 | Stop reason: SDUPTHER

## 2021-12-08 RX ORDER — OXYCODONE HYDROCHLORIDE 5 MG/1
10 TABLET ORAL EVERY 4 HOURS PRN
Status: DISCONTINUED | OUTPATIENT
Start: 2021-12-08 | End: 2021-12-12 | Stop reason: HOSPADM

## 2021-12-08 RX ORDER — ONDANSETRON 2 MG/ML
INJECTION INTRAMUSCULAR; INTRAVENOUS PRN
Status: DISCONTINUED | OUTPATIENT
Start: 2021-12-08 | End: 2021-12-08 | Stop reason: SDUPTHER

## 2021-12-08 RX ORDER — HYDROMORPHONE HCL 110MG/55ML
PATIENT CONTROLLED ANALGESIA SYRINGE INTRAVENOUS PRN
Status: DISCONTINUED | OUTPATIENT
Start: 2021-12-08 | End: 2021-12-08 | Stop reason: SDUPTHER

## 2021-12-08 RX ORDER — SUCCINYLCHOLINE/SOD CL,ISO/PF 100 MG/5ML
SYRINGE (ML) INTRAVENOUS PRN
Status: DISCONTINUED | OUTPATIENT
Start: 2021-12-08 | End: 2021-12-08 | Stop reason: SDUPTHER

## 2021-12-08 RX ORDER — NICOTINE POLACRILEX 4 MG
15 LOZENGE BUCCAL PRN
Status: DISCONTINUED | OUTPATIENT
Start: 2021-12-08 | End: 2021-12-09 | Stop reason: SDUPTHER

## 2021-12-08 RX ORDER — INSULIN LISPRO 100 [IU]/ML
0-6 INJECTION, SOLUTION INTRAVENOUS; SUBCUTANEOUS
Status: DISCONTINUED | OUTPATIENT
Start: 2021-12-09 | End: 2021-12-09

## 2021-12-08 RX ORDER — ONDANSETRON 4 MG/1
4 TABLET, ORALLY DISINTEGRATING ORAL EVERY 8 HOURS PRN
Status: DISCONTINUED | OUTPATIENT
Start: 2021-12-08 | End: 2021-12-12 | Stop reason: HOSPADM

## 2021-12-08 RX ORDER — ONDANSETRON 2 MG/ML
4 INJECTION INTRAMUSCULAR; INTRAVENOUS EVERY 6 HOURS PRN
Status: DISCONTINUED | OUTPATIENT
Start: 2021-12-08 | End: 2021-12-12 | Stop reason: HOSPADM

## 2021-12-08 RX ADMIN — SODIUM CHLORIDE, POTASSIUM CHLORIDE, SODIUM LACTATE AND CALCIUM CHLORIDE: 600; 310; 30; 20 INJECTION, SOLUTION INTRAVENOUS at 18:40

## 2021-12-08 RX ADMIN — HYDROMORPHONE HYDROCHLORIDE 1 MG: 2 INJECTION, SOLUTION INTRAMUSCULAR; INTRAVENOUS; SUBCUTANEOUS at 18:06

## 2021-12-08 RX ADMIN — SODIUM CHLORIDE, PRESERVATIVE FREE 10 ML: 5 INJECTION INTRAVENOUS at 22:24

## 2021-12-08 RX ADMIN — PROPOFOL 160 MG: 10 INJECTION, EMULSION INTRAVENOUS at 17:32

## 2021-12-08 RX ADMIN — SODIUM CHLORIDE, POTASSIUM CHLORIDE, SODIUM LACTATE AND CALCIUM CHLORIDE 1000 ML: 600; 310; 30; 20 INJECTION, SOLUTION INTRAVENOUS at 10:57

## 2021-12-08 RX ADMIN — FENTANYL CITRATE 50 MCG: 50 INJECTION, SOLUTION INTRAMUSCULAR; INTRAVENOUS at 17:32

## 2021-12-08 RX ADMIN — SODIUM CHLORIDE, POTASSIUM CHLORIDE, SODIUM LACTATE AND CALCIUM CHLORIDE 1000 ML: 600; 310; 30; 20 INJECTION, SOLUTION INTRAVENOUS at 13:08

## 2021-12-08 RX ADMIN — ONDANSETRON 4 MG: 2 INJECTION INTRAMUSCULAR; INTRAVENOUS at 19:21

## 2021-12-08 RX ADMIN — ROCURONIUM BROMIDE 100 MG: 10 INJECTION INTRAVENOUS at 17:39

## 2021-12-08 RX ADMIN — FENTANYL CITRATE 50 MCG: 50 INJECTION, SOLUTION INTRAMUSCULAR; INTRAVENOUS at 17:26

## 2021-12-08 RX ADMIN — ONDANSETRON 4 MG: 2 INJECTION INTRAMUSCULAR; INTRAVENOUS at 10:57

## 2021-12-08 RX ADMIN — METRONIDAZOLE 500 MG: 500 INJECTION, SOLUTION INTRAVENOUS at 17:42

## 2021-12-08 RX ADMIN — MIDAZOLAM HYDROCHLORIDE 2 MG: 2 INJECTION, SOLUTION INTRAMUSCULAR; INTRAVENOUS at 19:38

## 2021-12-08 RX ADMIN — ACETAMINOPHEN 1000 MG: 500 TABLET ORAL at 22:21

## 2021-12-08 RX ADMIN — SODIUM CHLORIDE, PRESERVATIVE FREE 10 ML: 5 INJECTION INTRAVENOUS at 22:30

## 2021-12-08 RX ADMIN — DEXAMETHASONE SODIUM PHOSPHATE 4 MG: 4 INJECTION, SOLUTION INTRAMUSCULAR; INTRAVENOUS at 19:22

## 2021-12-08 RX ADMIN — Medication 160 MG: at 17:33

## 2021-12-08 RX ADMIN — SUGAMMADEX 200 MG: 100 INJECTION, SOLUTION INTRAVENOUS at 19:26

## 2021-12-08 RX ADMIN — PROMETHAZINE HYDROCHLORIDE 12.5 MG: 25 INJECTION INTRAMUSCULAR; INTRAVENOUS at 13:09

## 2021-12-08 RX ADMIN — INSULIN HUMAN 2 UNITS: 100 INJECTION, SOLUTION PARENTERAL at 23:57

## 2021-12-08 RX ADMIN — HYDROMORPHONE HYDROCHLORIDE 1 MG: 1 INJECTION, SOLUTION INTRAMUSCULAR; INTRAVENOUS; SUBCUTANEOUS at 13:00

## 2021-12-08 RX ADMIN — SODIUM CHLORIDE, POTASSIUM CHLORIDE, SODIUM LACTATE AND CALCIUM CHLORIDE: 600; 310; 30; 20 INJECTION, SOLUTION INTRAVENOUS at 14:02

## 2021-12-08 RX ADMIN — FENTANYL CITRATE 50 MCG: 50 INJECTION INTRAMUSCULAR; INTRAVENOUS at 20:03

## 2021-12-08 RX ADMIN — CEFAZOLIN SODIUM 2000 MG: 10 INJECTION, POWDER, FOR SOLUTION INTRAVENOUS at 17:35

## 2021-12-08 RX ADMIN — HYDROMORPHONE HYDROCHLORIDE 1 MG: 2 INJECTION, SOLUTION INTRAMUSCULAR; INTRAVENOUS; SUBCUTANEOUS at 18:42

## 2021-12-08 RX ADMIN — LIDOCAINE HYDROCHLORIDE 50 MG: 20 INJECTION, SOLUTION EPIDURAL; INFILTRATION; INTRACAUDAL; PERINEURAL at 17:32

## 2021-12-08 ASSESSMENT — ENCOUNTER SYMPTOMS
FACIAL SWELLING: 0
SORE THROAT: 0
BACK PAIN: 0
WHEEZING: 0
SHORTNESS OF BREATH: 0
COUGH: 0
ABDOMINAL PAIN: 1
RECTAL PAIN: 0
NAUSEA: 1
BLOOD IN STOOL: 0
VOMITING: 1
CHEST TIGHTNESS: 0
TROUBLE SWALLOWING: 0
DIARRHEA: 1

## 2021-12-08 ASSESSMENT — PULMONARY FUNCTION TESTS
PIF_VALUE: 22
PIF_VALUE: 22
PIF_VALUE: 20
PIF_VALUE: 22
PIF_VALUE: 24
PIF_VALUE: 23
PIF_VALUE: 22
PIF_VALUE: 23
PIF_VALUE: 22
PIF_VALUE: 23
PIF_VALUE: 20
PIF_VALUE: 23
PIF_VALUE: 23
PIF_VALUE: 21
PIF_VALUE: 23
PIF_VALUE: 22
PIF_VALUE: 23
PIF_VALUE: 22
PIF_VALUE: 23
PIF_VALUE: 20
PIF_VALUE: 20
PIF_VALUE: 23
PIF_VALUE: 22
PIF_VALUE: 24
PIF_VALUE: 22
PIF_VALUE: 23
PIF_VALUE: 21
PIF_VALUE: 23
PIF_VALUE: 0
PIF_VALUE: 24
PIF_VALUE: 19
PIF_VALUE: 2
PIF_VALUE: 20
PIF_VALUE: 23
PIF_VALUE: 23
PIF_VALUE: 22
PIF_VALUE: 21
PIF_VALUE: 22
PIF_VALUE: 22
PIF_VALUE: 0
PIF_VALUE: 22
PIF_VALUE: 23
PIF_VALUE: 23
PIF_VALUE: 22
PIF_VALUE: 23
PIF_VALUE: 1
PIF_VALUE: 22
PIF_VALUE: 23
PIF_VALUE: 22
PIF_VALUE: 23
PIF_VALUE: 21
PIF_VALUE: 23
PIF_VALUE: 22
PIF_VALUE: 23
PIF_VALUE: 20
PIF_VALUE: 23
PIF_VALUE: 22
PIF_VALUE: 22
PIF_VALUE: 23
PIF_VALUE: 23
PIF_VALUE: 22
PIF_VALUE: 23
PIF_VALUE: 23
PIF_VALUE: 22
PIF_VALUE: 23
PIF_VALUE: 22
PIF_VALUE: 23
PIF_VALUE: 23
PIF_VALUE: 1
PIF_VALUE: 23
PIF_VALUE: 24
PIF_VALUE: 23
PIF_VALUE: 23
PIF_VALUE: 22
PIF_VALUE: 22
PIF_VALUE: 21
PIF_VALUE: 23
PIF_VALUE: 22
PIF_VALUE: 1
PIF_VALUE: 23
PIF_VALUE: 22
PIF_VALUE: 23
PIF_VALUE: 21
PIF_VALUE: 0
PIF_VALUE: 23
PIF_VALUE: 22
PIF_VALUE: 23
PIF_VALUE: 22
PIF_VALUE: 23
PIF_VALUE: 24
PIF_VALUE: 22
PIF_VALUE: 1
PIF_VALUE: 22
PIF_VALUE: 23
PIF_VALUE: 22
PIF_VALUE: 1
PIF_VALUE: 0
PIF_VALUE: 0
PIF_VALUE: 23
PIF_VALUE: 22
PIF_VALUE: 23
PIF_VALUE: 21
PIF_VALUE: 0
PIF_VALUE: 21

## 2021-12-08 ASSESSMENT — PAIN DESCRIPTION - LOCATION
LOCATION: ABDOMEN
LOCATION: UMBILICUS
LOCATION: ABDOMEN

## 2021-12-08 ASSESSMENT — PAIN DESCRIPTION - PAIN TYPE
TYPE: ACUTE PAIN
TYPE: SURGICAL PAIN
TYPE: SURGICAL PAIN

## 2021-12-08 ASSESSMENT — PAIN DESCRIPTION - ONSET
ONSET: ON-GOING
ONSET: ON-GOING

## 2021-12-08 ASSESSMENT — PAIN SCALES - GENERAL
PAINLEVEL_OUTOF10: 10
PAINLEVEL_OUTOF10: 1
PAINLEVEL_OUTOF10: 4
PAINLEVEL_OUTOF10: 0
PAINLEVEL_OUTOF10: 8
PAINLEVEL_OUTOF10: 0
PAINLEVEL_OUTOF10: 8
PAINLEVEL_OUTOF10: 0

## 2021-12-08 ASSESSMENT — PAIN DESCRIPTION - ORIENTATION
ORIENTATION: LOWER
ORIENTATION: MID
ORIENTATION: LOWER

## 2021-12-08 ASSESSMENT — LIFESTYLE VARIABLES: SMOKING_STATUS: 0

## 2021-12-08 ASSESSMENT — PAIN DESCRIPTION - DESCRIPTORS
DESCRIPTORS: ACHING
DESCRIPTORS: DISCOMFORT
DESCRIPTORS: DISCOMFORT

## 2021-12-08 ASSESSMENT — PAIN DESCRIPTION - FREQUENCY
FREQUENCY: CONTINUOUS
FREQUENCY: CONTINUOUS

## 2021-12-08 NOTE — PROGRESS NOTES
PRE-OP NOTE  Department of Surgery      Chief Complaint or Reason for Surgery: Incarcerated Umbilical hernia    Procedure: Open umbilical hernia repair with mesh  Expected time: Add on    Plan  1. Diet: NPO now  2. IVF:   3. Antibiotics: Ancef and flagyl given possible bowel containing hernia  4. Labs to be drawn: Type and Screen, INR  5. Anesthesia: to see patient  6. Consent: Obtained and placed in chart  7. Pulmonary: CXR: 12/8/21 reviewed no acute pathology  8.  Cardiac: EKG: in room and reviewed    Scottie Spencer DO  12/08/21  1:32 PM

## 2021-12-08 NOTE — ED PROVIDER NOTES
[address] ENCOUNTER          PHYSICIAN ASSISTANT NOTE       Date of evaluation: 12/8/2021    Chief Complaint     Abdominal Pain (x3- 4 days was intermittent now has been constant since 3pm yesterday, states he was told he may have an umbilical hernia after prostate surgery. pain getting unbearable) and Emesis (started vomiting today, not passing gas, had normal BM monday, small BM yesterday)      History of Present Illness     Beulah Glover is a 73 y.o. male who presents complaining of abdominal pain mostly periumbilical that is been present over the last  5 days. Patient does have a history of umbilical hernia after prostate surgery but denies any other surgeries. He started having nausea vomiting since yesterday and states he has had a total of 4 episodes of emesis nonbilious nonbloody. Has had diarrhea yesterday and no bowel movement today and not passing gas. He denies any hematuria, frequency or urgency. Denies any blood in stool. He denies any chest pain, shortness of breath, cough or hemoptysis. Denies any dizziness or lightheadedness or syncope. Review of Systems     Review of Systems   Constitutional: Negative for chills, diaphoresis, fatigue and fever. HENT: Negative for congestion, facial swelling, postnasal drip, sore throat and trouble swallowing. Eyes: Negative for visual disturbance. Respiratory: Negative for cough, chest tightness, shortness of breath and wheezing. Cardiovascular: Negative for chest pain, palpitations and leg swelling. Gastrointestinal: Positive for abdominal pain, diarrhea, nausea and vomiting. Negative for blood in stool and rectal pain. Genitourinary: Negative for dysuria, flank pain, frequency and hematuria. Musculoskeletal: Negative for back pain, myalgias, neck pain and neck stiffness. Skin: Negative for rash. Neurological: Negative for dizziness, syncope, weakness, light-headedness, numbness and headaches. Past Medical, Surgical, Family, and Social History     He has a past medical history of Cancer ([address] Hyperglycemia, Hyperlipidemia, and Knee injury. He has a past surgical history that includes Tonsillectomy and Prostate surgery. His family history includes Cancer in his father. He reports that he has never smoked. He has never used smokeless tobacco. He reports current alcohol use. He reports that he does not use drugs. Medications     Previous Medications    FISH OIL-OMEGA-3 FATTY ACIDS 1000 MG CAPSULE    Take 2 g by mouth daily. FREESTYLE LANCETS MISC    by Does not apply route. Test 3 times a week or as directed     GLUCOSE BLOOD VI TEST STRIPS (FREESTYLE LITE) STRIP    by In Vitro route as needed. Test 3 times a week or as directed     LISINOPRIL (PRINIVIL;ZESTRIL) 20 MG TABLET    Take 1 tablet by mouth daily    METFORMIN (GLUCOPHAGE) 500 MG TABLET    Take 1 tablet by mouth daily (with breakfast)    METRONIDAZOLE (METROGEL) 0.75 % GEL    Apply topically 2 times daily. PRAVASTATIN (PRAVACHOL) 20 MG TABLET    Take 1 tablet by mouth daily    THERAPEUTIC MULTIVITAMIN-MINERALS (THERAGRAN-M) TABLET    Take 1 tablet by mouth daily. Allergies     He has No Known Allergies. Physical Exam     INITIAL VITALS: BP: (!) 169/114, Temp: 97.6 °F (36.4 °C), Pulse: 116, Resp: 16, SpO2: 98 %  Physical Exam  Vitals and nursing note reviewed. Constitutional:       General: He is not in acute distress. Appearance: Normal appearance. He is not ill-appearing, toxic-appearing or diaphoretic. HENT:      Head: Normocephalic and atraumatic. Right Ear: External ear normal.      Left Ear: External ear normal.      Nose: Nose normal.      Mouth/Throat:      Pharynx: Oropharynx is clear. Eyes:      Conjunctiva/sclera: Conjunctivae normal.   Cardiovascular:      Rate and Rhythm: Regular rhythm. Tachycardia present. Pulses: Normal pulses. Heart sounds: Normal heart sounds.    Pulmonary: Effort: Pulmonary effort is normal.      Breath sounds: Normal breath sounds. Chest:      Chest wall: No tenderness. Abdominal:      General: Bowel sounds are normal. There is distension. Palpations: Abdomen is soft. There is no mass. Tenderness: There is abdominal tenderness in the periumbilical area. There is guarding. There is no right CVA tenderness, left CVA tenderness or rebound. Hernia: A hernia is present. Hernia is present in the umbilical area. Comments: Patient has tenderness mostly periumbilical.  He does have an umbilical hernia that is not reducible at this time. He does have tenderness to the umbilical hernia. Minimal erythema around the periumbilical area   Musculoskeletal:         General: No swelling or tenderness. Normal range of motion. Cervical back: Normal range of motion and neck supple. Skin:     General: Skin is warm and dry. Neurological:      General: No focal deficit present. Mental Status: He is alert and oriented to person, place, and time. Cranial Nerves: No cranial nerve deficit. Sensory: No sensory deficit. Motor: No weakness. Diagnostic Results     EKG   Interpreted in conjunction with emergency department physician Dr Franky Hallman  EKG Interpretation  Rhythm: Sinus tachycardia with PVCs  Rate: Tachycardic heart rate 103  Axis: normal  Ectopy: none  Conduction: normal  ST Segments: no acute change  T Waves: no acute change  Q Waves: none    Clinical Impression: Sinus tachycardia with PVCs    RADIOLOGY:  XR ACUTE ABD SERIES CHEST 1 VW   Final Result       1. No acute cardiopulmonary abnormality. 2.  Nonobstructive bowel gas pattern with moderate stool burden.    3.  No free air                   LABS:   Results for orders placed or performed during the hospital encounter of 12/08/21   CBC Auto Differential   Result Value Ref Range    WBC 12.0 (H) 4.0 - 11.0 K/uL    RBC 4.94 4.20 - 5.90 M/uL    Hemoglobin 16.1 13.5 - 17.5 g/dL Hematocrit 46.9 40.5 - 52.5 %    MCV 95.1 80.0 - 100.0 fL    MCH 32.7 26.0 - 34.0 pg    MCHC 34.4 31.0 - 36.0 g/dL    RDW 13.1 12.4 - 15.4 %    Platelets 521 882 - 039 K/uL    MPV 8.1 5.0 - 10.5 fL    Neutrophils % 78.0 %    Lymphocytes % 14.1 %    Monocytes % 6.4 %    Eosinophils % 1.0 %    Basophils % 0.5 %    Neutrophils Absolute 9.3 (H) 1.7 - 7.7 K/uL    Lymphocytes Absolute 1.7 1.0 - 5.1 K/uL    Monocytes Absolute 0.8 0.0 - 1.3 K/uL    Eosinophils Absolute 0.1 0.0 - 0.6 K/uL    Basophils Absolute 0.1 0.0 - 0.2 K/uL   Basic Metabolic Panel w/ Reflex to MG   Result Value Ref Range    Sodium 135 (L) 136 - 145 mmol/L    Potassium reflex Magnesium 4.3 3.5 - 5.1 mmol/L    Chloride 95 (L) 99 - 110 mmol/L    CO2 22 21 - 32 mmol/L    Anion Gap 18 (H) 3 - 16    Glucose 201 (H) 70 - 99 mg/dL    BUN 15 7 - 20 mg/dL    CREATININE 0.8 0.8 - 1.3 mg/dL    GFR Non-African American >60 >60    GFR African American >60 >60    Calcium 10.6 8.3 - 10.6 mg/dL   Hepatic Function Panel   Result Value Ref Range    Total Protein 7.6 6.4 - 8.2 g/dL    Albumin 4.7 3.4 - 5.0 g/dL    Alkaline Phosphatase 71 40 - 129 U/L    ALT 21 10 - 40 U/L    AST 21 15 - 37 U/L    Total Bilirubin 0.9 0.0 - 1.0 mg/dL    Bilirubin, Direct <0.2 0.0 - 0.3 mg/dL    Bilirubin, Indirect see below 0.0 - 1.0 mg/dL   Lipase   Result Value Ref Range    Lipase 29.0 13.0 - 60.0 U/L   Troponin   Result Value Ref Range    Troponin <0.01 <0.01 ng/mL   Brain Natriuretic Peptide   Result Value Ref Range    Pro-BNP 74 0 - 124 pg/mL           RECENT VITALS:  BP: (!) 80/54, Temp: 97.6 °F (36.4 °C), Pulse: 77, Resp: 16, SpO2: 99 %     Procedures       ED Course     Nursing Notes, Past Medical Hx,Past Surgical Hx, Social Hx, Allergies, and Family Hx were reviewed.     The patient was given the following medications:  Orders Placed This Encounter   Medications    ondansetron (ZOFRAN) injection 4 mg    lactated ringers bolus    HYDROmorphone (DILAUDID) injection 1 mg    lactated ringers bolus    promethazine (PHENERGAN) injection 12.5 mg       CONSULTS:  C/ Jose [address] 19 / ASSESSMENT / Abundio Rochelle is a 73 y.o. male presents to the emergency department with complaints of umbilical abdominal pain and distention. He is also had nausea vomiting over the last 24 hours. He does have an umbilical hernia that does appear to be incarcerated. He does have tenderness and some erythema to the area. CBC shows white count of 12.0 with hemoglobin 16.1. BMP is within normal limits with glucose of 201 and a creatinine 0.8. LFTs and lipase are normal.  Troponin less than 0.01 and proBNP 74. EKG showed tachycardia with PVCs but no acute ischemic change. Acute abdominal series x-ray showed nonobstructive bowel gas pattern and no free air. Patient did not want pain medication initially but then surgery came down and tried to reduce his hernia so was having significant pain. He was given Dilaudid. His pressure did drop to 80/54 likely secondary to Dilaudid and pain. He is getting another liter of IV fluids as well as got Zofran and Phenergan for nausea. Surgery will take the patient to the OR for incarcerated hernia and likely discharge after this if no complications. Patient has remained a stable condition. He is alert and oriented although hypotensive. This patient was also evaluated by the attending physician. All care plans were discussed and agreed upon. Clinical Impression     1. Incarcerated umbilical hernia        Disposition     PATIENT REFERRED TO:  No follow-up provider specified.     DISCHARGE MEDICATIONS:  New Prescriptions    No medications on file       DISPOSITION  Admit to surgery      Kylah Serra, [id number]  12/08/21 [id number]

## 2021-12-08 NOTE — CONSULTS
General Surgery   Consult Note    Reason for Consult: incarcerated umbilical hernia    History of Present Illness:   Azul Clark is a 73 y.o. male with Hx of prostate CA, HLD, hyperglycemia presented to [facility] after 5 days of periumbilical abd pain. States he has a known umbilical hernia from his prostatectomy 15+ years ago, that sometimes \"pops out\" and he is typically able to push it back in. He associates this pain to his belly button \"popping out. \" He was unable to reduce his hernia this time. He also treats this pain with a gas tablet and antacid. He started having nausea with emesis this morning. The pt had 7 episodes of vomiting and he states this was non bloody. His last meal was yesterday for which he ate some soup. He has been unable to take fluids since. Last normal BM was Monday however he states he had a small BM yesterday. He has not had a BM or flatus since yesterday. Denies Blood in stool, fever, chills,       Past Medical History:        Diagnosis Date    Cancer [facility])     prostate    Hyperglycemia     Hyperlipidemia     Knee injury     right knee       Past Surgical History:        Procedure Laterality Date    PROSTATE SURGERY      prostate removal    TONSILLECTOMY         Allergies:  Patient has no known allergies. Medications:   Home Meds  No current facility-administered medications on file prior to encounter. Current Outpatient Medications on File Prior to Encounter   Medication Sig Dispense Refill    metFORMIN (GLUCOPHAGE) 500 MG tablet Take 1 tablet by mouth daily (with breakfast) 90 tablet 3    lisinopril (PRINIVIL;ZESTRIL) 20 MG tablet Take 1 tablet by mouth daily 90 tablet 3    pravastatin (PRAVACHOL) 20 MG tablet Take 1 tablet by mouth daily 90 tablet 3    therapeutic multivitamin-minerals (THERAGRAN-M) tablet Take 1 tablet by mouth daily.  fish oil-omega-3 fatty acids 1000 MG capsule Take 2 g by mouth daily.         metroNIDAZOLE (METROGEL) 0.75 % gel Apply topically 2 times daily. 1 Tube 5    glucose blood VI test strips (FREESTYLE LITE) strip by In Vitro route as needed. Test 3 times a week or as directed       FreeStyle Lancets MISC by Does not apply route. Test 3 times a week or as directed          Current Meds  lactated ringers bolus, Once        Family History:   Family History   Problem Relation Age of Onset    Cancer Father         prostate       Social History:   TOBACCO:   reports that he has never smoked. He has never used smokeless tobacco.  ETOH:   reports current alcohol use. DRUGS:   reports no history of drug use. Review of Systems:   14 point review of systems was completed and all pertinent positives and negatives were included in the HPI. Physical exam:    Vitals:    12/08/21 1022   BP: (!) 169/114   Pulse: 116   Resp: 16   Temp: 97.6 °F (36.4 °C)   TempSrc: Oral   SpO2: 98%   Weight: 234 lb (106.1 kg)   Height: 6' 3\" (1.905 m)       General appearance: alert, no acute distress, grooming appropriate  Eyes: no scleral icterus  Neck: trachea midline, no JVD, no lymphadenopathy  Chest/Lungs: CTAB, no crackles/rales, wheezes/rhonchi, normal effort  Cardiovascular: RRR, no murmurs/gallops/rubs  Abdomen: soft, janessa-umbilical tenderness, janessa-umbilical erythema, non-distended, some guarding with palpation, no rigidity  Skin: warm and dry, no rashes  Extremities: no edema, no cyanosis  Neuro: A&Ox3, no focal deficits, sensation intact    Labs:    CBC:   Recent Labs     12/08/21  1031   WBC 12.0*   HGB 16.1   HCT 46.9   MCV 95.1        BMP:   Recent Labs     12/08/21  1031   *   K 4.3   CL 95*   CO2 22   BUN 15   CREATININE 0.8     PT/INR: No results for input(s): PROTIME, INR in the last 72 hours. APTT: No results for input(s): APTT in the last 72 hours.   Liver Profile:   Lab Results   Component Value Date    AST 21 12/08/2021    ALT 21 12/08/2021    BILIDIR <0.2 12/08/2021    BILITOT 0.9 12/08/2021    ALKPHOS 71 12/08/2021     Lab Results   Component Value Date    CHOL 212 07/15/2021    HDL 63 07/15/2021    HDL 63 09/17/2010    TRIG 226 07/15/2021     UA:   Lab Results   Component Value Date    NITRITE 0 07/11/2019    COLORU yellow 07/11/2019    PHUR 5.0 07/11/2019    CLARITYU clear 07/11/2019    SPECGRAV 1.030 07/11/2019    LEUKOCYTESUR 0 07/11/2019    BILIRUBINUR 0 07/11/2019    BLOODU 0 07/11/2019    GLUCOSEU 0 07/11/2019       Imaging:   XR ACUTE ABD SERIES CHEST 1 VW   Final Result       1. No acute cardiopulmonary abnormality. 2.  Nonobstructive bowel gas pattern with moderate stool burden. 3.  No free air                     Assessment/Plan: This is a 73 y.o. male with Hx of prostate CA with prostatectomy, HLD, hyperglycemia presents with janessa umbilical pain and erythema. Pt states he has a known umbilical hernia for which he can usually \"pop\" back in and this resolves the pain. WBC 12.     - Will proceed with surgery today to repair umbilical hernia  - Discussed with patient who agrees with above plan  - Patient staffed with Dr. Zahida Kuhn who agrees with above plan    Lety Lew DO  PGY2, General Surgery  12/08/21  1:41 PM  [phone]    I have seen, examined, and reviewed the patients chart. I agree with the residents assessment and have made appropriate changes.     Arnie Altman

## 2021-12-08 NOTE — ED NOTES
Pt placed on 4L/nc d/t O2 SATs dropping below 90% after receiving medication.       Britt Garnica RN  12/08/21 0021

## 2021-12-08 NOTE — ANESTHESIA PRE PROCEDURE
Department of Anesthesiology  Preprocedure Note       Name:  Jazmine Delong   Age:  73 y.o.  :  1955                                          MRN:  [id number]         Date:  2021      Surgeon: Deborah Gregg):  Sonya Jacob MD    Procedure: Procedure(s):  OPEN UMBILICAL HERNIA REPAIR WITH MESH AND POSSIBLE SMALL BOWEL RESECTION    Medications prior to admission:   Prior to Admission medications    Medication Sig Start Date End Date Taking? Authorizing Provider   metFORMIN (GLUCOPHAGE) 500 MG tablet Take 1 tablet by mouth daily (with breakfast) 21  Yes Carlos Beatty MD   lisinopril (PRINIVIL;ZESTRIL) 20 MG tablet Take 1 tablet by mouth daily 21  Yes Carlos Beatty MD   pravastatin (PRAVACHOL) 20 MG tablet Take 1 tablet by mouth daily 21  Yes Carlos Beatty MD   therapeutic multivitamin-minerals [facility]) tablet Take 1 tablet by mouth daily. Yes Historical Provider, MD   fish oil-omega-3 fatty acids 1000 MG capsule Take 2 g by mouth daily. Yes Historical Provider, MD   metroNIDAZOLE (METROGEL) 0.75 % gel Apply topically 2 times daily. 20   DESTINY Mccloud MD   glucose blood VI test strips (FREESTYLE LITE) strip by In Vitro route as needed. Test 3 times a week or as directed     Historical Provider, MD   FreeStyle Lancets MISC by Does not apply route.  Test 3 times a week or as directed     Historical Provider, MD       Current medications:    Current Facility-Administered Medications   Medication Dose Route Frequency Provider Last Rate Last Admin    lactated ringers infusion   IntraVENous Continuous Chepe Galo  mL/hr at 21 1402 New Bag at 21 1402    enoxaparin (LOVENOX) injection 40 mg  40 mg SubCUTAneous Once Ramez Hampton MD        bupivacaine (PF) (MARCAINE) 0.5 % injection    PRN Sonya Jacob MD   30 mL at 21 1754    sodium chloride 0.9 % irrigation    Continuous PRN Sonya Jacob MD   1,000 mL at 21 1755 NPO Status: Time of last liquid consumption: 1800                        Time of last solid consumption: 1800                        Date of last liquid consumption: 12/07/21                        Date of last solid food consumption: 12/07/21    BMI:   Wt Readings from Last 3 Encounters:   12/08/21 234 lb (106.1 kg)   07/22/21 244 lb (110.7 kg)   07/14/20 244 lb 12.8 oz (111 kg)     Body mass index is 29.25 kg/m². CBC:   Lab Results   Component Value Date    WBC 12.0 12/08/2021    RBC 4.94 12/08/2021    HGB 16.1 12/08/2021    HCT 46.9 12/08/2021    MCV 95.1 12/08/2021    RDW 13.1 12/08/2021     12/08/2021       CMP:   Lab Results   Component Value Date     12/08/2021    K 4.3 12/08/2021    CL 95 12/08/2021    CO2 22 12/08/2021    BUN 15 12/08/2021    CREATININE 0.8 12/08/2021    GFRAA >60 12/08/2021    GFRAA 112 12/01/2011    AGRATIO 1.9 07/15/2021    LABGLOM >60 12/08/2021    LABGLOM 98 01/03/2019    GLUCOSE 201 12/08/2021    GLUCOSE 136 04/03/2012    PROT 7.6 12/08/2021    PROT 7.1 01/28/2013    CALCIUM 10.6 12/08/2021    BILITOT 0.9 12/08/2021    ALKPHOS 71 12/08/2021    AST 21 12/08/2021    ALT 21 12/08/2021       POC Tests: No results for input(s): POCGLU, POCNA, POCK, POCCL, POCBUN, POCHEMO, POCHCT in the last 72 hours.     Coags:   Lab Results   Component Value Date    PROTIME 11.3 12/08/2021    INR 1.00 12/08/2021       HCG (If Applicable): No results found for: PREGTESTUR, PREGSERUM, HCG, HCGQUANT     ABGs: No results found for: PHART, PO2ART, YAF3TAQ, WYN2BQA, BEART, H2ZDXJII     Type & Screen (If Applicable):  No results found for: LABABO, LABRH    Drug/Infectious Status (If Applicable):  Lab Results   Component Value Date    HEPCAB NON-REACTIVE 06/12/2018       COVID-19 Screening (If Applicable): No results found for: COVID19        Anesthesia Evaluation  Patient summary reviewed and Nursing notes reviewed no history of anesthetic complications:   Airway: Mallampati: III  TM

## 2021-12-09 LAB
ANION GAP SERPL CALCULATED.3IONS-SCNC: 11 MMOL/L (ref 3–16)
BASOPHILS ABSOLUTE: 0 K/UL (ref 0–0.2)
BASOPHILS RELATIVE PERCENT: 0.2 %
BUN BLDV-MCNC: 19 MG/DL (ref 7–20)
CALCIUM SERPL-MCNC: 8.6 MG/DL (ref 8.3–10.6)
CHLORIDE BLD-SCNC: 100 MMOL/L (ref 99–110)
CO2: 26 MMOL/L (ref 21–32)
CREAT SERPL-MCNC: 0.8 MG/DL (ref 0.8–1.3)
EOSINOPHILS ABSOLUTE: 0 K/UL (ref 0–0.6)
EOSINOPHILS RELATIVE PERCENT: 0.1 %
GFR AFRICAN AMERICAN: >60
GFR NON-AFRICAN AMERICAN: >60
GLUCOSE BLD-MCNC: 118 MG/DL (ref 70–99)
GLUCOSE BLD-MCNC: 142 MG/DL (ref 70–99)
GLUCOSE BLD-MCNC: 149 MG/DL (ref 70–99)
GLUCOSE BLD-MCNC: 153 MG/DL (ref 70–99)
GLUCOSE BLD-MCNC: 192 MG/DL (ref 70–99)
HCT VFR BLD CALC: 36.7 % (ref 40.5–52.5)
HEMOGLOBIN: 12.7 G/DL (ref 13.5–17.5)
LYMPHOCYTES ABSOLUTE: 0.6 K/UL (ref 1–5.1)
LYMPHOCYTES RELATIVE PERCENT: 9.2 %
MAGNESIUM: 1.5 MG/DL (ref 1.8–2.4)
MCH RBC QN AUTO: 33.3 PG (ref 26–34)
MCHC RBC AUTO-ENTMCNC: 34.6 G/DL (ref 31–36)
MCV RBC AUTO: 96.1 FL (ref 80–100)
MONOCYTES ABSOLUTE: 0.6 K/UL (ref 0–1.3)
MONOCYTES RELATIVE PERCENT: 8 %
NEUTROPHILS ABSOLUTE: 5.8 K/UL (ref 1.7–7.7)
NEUTROPHILS RELATIVE PERCENT: 82.5 %
PDW BLD-RTO: 12.8 % (ref 12.4–15.4)
PERFORMED ON: ABNORMAL
PHOSPHORUS: 3.7 MG/DL (ref 2.5–4.9)
PLATELET # BLD: 169 K/UL (ref 135–450)
PMV BLD AUTO: 8.3 FL (ref 5–10.5)
POTASSIUM REFLEX MAGNESIUM: 4.6 MMOL/L (ref 3.5–5.1)
RBC # BLD: 3.81 M/UL (ref 4.2–5.9)
SODIUM BLD-SCNC: 137 MMOL/L (ref 136–145)
WBC # BLD: 7 K/UL (ref 4–11)

## 2021-12-09 PROCEDURE — 6370000000 HC RX 637 (ALT 250 FOR IP): Performed by: STUDENT IN AN ORGANIZED HEALTH CARE EDUCATION/TRAINING PROGRAM

## 2021-12-09 PROCEDURE — 2500000003 HC RX 250 WO HCPCS

## 2021-12-09 PROCEDURE — 2580000003 HC RX 258

## 2021-12-09 PROCEDURE — 83735 ASSAY OF MAGNESIUM: CPT

## 2021-12-09 PROCEDURE — 99024 POSTOP FOLLOW-UP VISIT: CPT | Performed by: SURGERY

## 2021-12-09 PROCEDURE — 80048 BASIC METABOLIC PNL TOTAL CA: CPT

## 2021-12-09 PROCEDURE — 2580000003 HC RX 258: Performed by: STUDENT IN AN ORGANIZED HEALTH CARE EDUCATION/TRAINING PROGRAM

## 2021-12-09 PROCEDURE — 85025 COMPLETE CBC W/AUTO DIFF WBC: CPT

## 2021-12-09 PROCEDURE — 36415 COLL VENOUS BLD VENIPUNCTURE: CPT

## 2021-12-09 PROCEDURE — 6360000002 HC RX W HCPCS: Performed by: NURSE PRACTITIONER

## 2021-12-09 PROCEDURE — 84100 ASSAY OF PHOSPHORUS: CPT

## 2021-12-09 PROCEDURE — 6360000002 HC RX W HCPCS

## 2021-12-09 PROCEDURE — 6360000002 HC RX W HCPCS: Performed by: STUDENT IN AN ORGANIZED HEALTH CARE EDUCATION/TRAINING PROGRAM

## 2021-12-09 PROCEDURE — 1200000000 HC SEMI PRIVATE

## 2021-12-09 RX ORDER — DOCUSATE SODIUM 100 MG/1
100 CAPSULE, LIQUID FILLED ORAL 2 TIMES DAILY
Qty: 20 CAPSULE | Refills: 0 | Status: SHIPPED | OUTPATIENT
Start: 2021-12-09 | End: 2021-12-19

## 2021-12-09 RX ORDER — MAGNESIUM SULFATE IN WATER 40 MG/ML
4000 INJECTION, SOLUTION INTRAVENOUS ONCE
Status: COMPLETED | OUTPATIENT
Start: 2021-12-09 | End: 2021-12-09

## 2021-12-09 RX ADMIN — SODIUM CHLORIDE, PRESERVATIVE FREE 10 ML: 5 INJECTION INTRAVENOUS at 20:50

## 2021-12-09 RX ADMIN — INSULIN HUMAN 2 UNITS: 100 INJECTION, SOLUTION PARENTERAL at 18:09

## 2021-12-09 RX ADMIN — MAGNESIUM SULFATE HEPTAHYDRATE 4000 MG: 4 INJECTION, SOLUTION INTRAVENOUS at 09:11

## 2021-12-09 RX ADMIN — SODIUM CHLORIDE, PRESERVATIVE FREE 10 ML: 5 INJECTION INTRAVENOUS at 09:13

## 2021-12-09 RX ADMIN — INSULIN HUMAN 2 UNITS: 100 INJECTION, SOLUTION PARENTERAL at 12:40

## 2021-12-09 RX ADMIN — SODIUM CHLORIDE 25 ML: 9 INJECTION, SOLUTION INTRAVENOUS at 06:18

## 2021-12-09 RX ADMIN — CEFTRIAXONE 1000 MG: 1 INJECTION, POWDER, FOR SOLUTION INTRAMUSCULAR; INTRAVENOUS at 02:15

## 2021-12-09 RX ADMIN — ACETAMINOPHEN 1000 MG: 500 TABLET ORAL at 06:00

## 2021-12-09 RX ADMIN — ENOXAPARIN SODIUM 40 MG: 100 INJECTION SUBCUTANEOUS at 09:04

## 2021-12-09 RX ADMIN — ACETAMINOPHEN 1000 MG: 500 TABLET ORAL at 14:50

## 2021-12-09 RX ADMIN — PRAVASTATIN SODIUM 20 MG: 20 TABLET ORAL at 09:04

## 2021-12-09 RX ADMIN — SODIUM CHLORIDE, PRESERVATIVE FREE 10 ML: 5 INJECTION INTRAVENOUS at 20:51

## 2021-12-09 RX ADMIN — ACETAMINOPHEN 1000 MG: 500 TABLET ORAL at 22:33

## 2021-12-09 RX ADMIN — METRONIDAZOLE 500 MG: 500 INJECTION, SOLUTION INTRAVENOUS at 16:43

## 2021-12-09 RX ADMIN — METRONIDAZOLE 500 MG: 500 INJECTION, SOLUTION INTRAVENOUS at 06:19

## 2021-12-09 ASSESSMENT — PAIN DESCRIPTION - PAIN TYPE
TYPE: ACUTE PAIN

## 2021-12-09 ASSESSMENT — PAIN DESCRIPTION - LOCATION
LOCATION: ABDOMEN

## 2021-12-09 ASSESSMENT — PAIN DESCRIPTION - ONSET
ONSET: ON-GOING
ONSET: ON-GOING

## 2021-12-09 ASSESSMENT — PAIN SCALES - GENERAL
PAINLEVEL_OUTOF10: 5
PAINLEVEL_OUTOF10: 4
PAINLEVEL_OUTOF10: 0

## 2021-12-09 ASSESSMENT — PAIN DESCRIPTION - ORIENTATION
ORIENTATION: MID

## 2021-12-09 ASSESSMENT — PAIN DESCRIPTION - DESCRIPTORS
DESCRIPTORS: SHARP

## 2021-12-09 ASSESSMENT — PAIN DESCRIPTION - FREQUENCY
FREQUENCY: INTERMITTENT

## 2021-12-09 NOTE — PROGRESS NOTES
Patient alert and oriented. VSS Pain controlled with Tylenol. CDI surgical sites, dressing changed  Once. , abdominal binder on. Patient uses IS,ambulates in triana. Patient in bed lowest position call light and bedside table within reach. All needs are met at this time. Patient aware to call if any help needed. Will continue to monitor  /79   Pulse 80   Temp 98.1 °F (36.7 °C) (Oral)   Resp 16   Ht 6' 3\" (1.905 m)   Wt 234 lb (106.1 kg)   SpO2 93%   BMI 29.25 kg/m²

## 2021-12-09 NOTE — PROGRESS NOTES
4 Eyes Admission Assessment     I agree as the admission nurse that 2 RN's have performed a thorough Head to Toe Skin Assessment on the patient. ALL assessment sites listed below have been assessed on admission. Areas assessed by both nurses:   [x]   Head, Face, and Ears   [x]   Shoulders, Back, and Chest  [x]   Arms, Elbows, and Hands   [x]   Coccyx, Sacrum, and Ischium  [x]   Legs, Feet, and Heels        Does the Patient have Skin Breakdown?   Patient has surgical sites,redness to scrotum         Alessandro Prevention initiated:  No   Wound Care Orders initiated:  No      [facility] nurse consulted for Pressure Injury (Stage 3,4, Unstageable, DTI, NWPT, and Complex wounds) or Alessandro score 18 or lower:  No      Nurse 1 eSignature: Electronically signed by Alivia Melchor RN on 12/9/21 at 8:02 AM EST    **SHARE this note so that the co-signing nurse is able to place an eSignature**    Nurse 2 eSignature: Electronically signed by Shade Kumari RN on 12/9/21 at 3:19 PM EST

## 2021-12-09 NOTE — OP NOTE
Operative Note      Patient: Lacy Bello  YOB: 1955  MRN: [id number]    Date of Procedure: 12/8/2021    Pre-Op Diagnosis: Strangulated incisional hernia    Post-Op Diagnosis: Strangulated Incisional hernia       Procedure performed: Open repair of a strangulated incisional hernia with rectorectus mesh    Surgeon(s):  Alisha Chacko MD    Assistant:   Surgical Assistant: Maryan Morales  Resident: Sravan Stephens MD    Anesthesia: General    Estimated Blood Loss (mL): Minimal    Complications: None    Specimens:   ID Type Source Tests Collected by Time Destination   A : 800 Rosmery Coley MD 12/8/2021 1814        Implants:  Implant Name Type Inv. Item Serial No.  Lot No. LRB No. Used Action   MESH SURG [id number] Patsey Putty [id number]  MESH SURG [id number] ABSRB BIO-A [id number] [employer]WD  N/A 1 Implanted          Findings:   Strangulated loop of bowel within incisional hernia, viable with alleviation     INDICATIONS FOR THE OPERATION:    This is a 73year-old male who presented to the ED setting complaining of severe abdominal pain at his umbilical hernia. He reports he has had a hernia at this site since his prostatectomy back in 2017. Given skin changes and acute pain and non reducible, patient was taken for emergent hernia repair. Risks and benefits were explained and the patient willingly consented to the procedure. DETAILS OF THE OPERATION:    The patient was brought to operating room and placed on the operating room table in the supine position. At that point, general anesthetic was administered. Once the patient was appropriately anesthetized, the abdomen was prepped and draped in the sterile fashion. Approximately 10 cm midline incision was made overlying the site of the ventral hernia including the umbilicus. Letter cautery was used to dissect down to the hernia sac.   The sac was carefully entered given concern for the entirety of this case.          Ally Stephenson MD  General Surgery Resident PGY 4  12/09/21  2:35 PM      Electronically signed by Dalila Clark MD on 12/9/2021 at 2:34 PM

## 2021-12-09 NOTE — PLAN OF CARE
Problem: Pain:  Goal: Pain level will decrease  Description: Pain level will decrease  Outcome: Ongoing  Note: Pt is receiving pain meds and is given pillow for abd support. Problem: Skin Integrity:  Goal: Will show no infection signs and symptoms  Description: Will show no infection signs and symptoms  Outcome: Ongoing  Note: Pt is receiving antibiotics     Problem: Skin Integrity:  Goal: Absence of new skin breakdown  Description: Absence of new skin breakdown  Outcome: Ongoing  Note: Pt is instructed to ambulate and turn in bed.

## 2021-12-09 NOTE — PROGRESS NOTES
General Surgery  Post-operative Note      Procedure(s) Performed: Open umbilical hernia repair with mesh    Subjective:   Patient's pain is controlled, denies nausea or vomiting. Tolerating diet. OOB, ambulating and voiding appropriately. Denies flatus or BM at this time. Objective:  Anesthesia type: General      I/O    Intra op    Post op     Fluids  1000 mL 400 mL     EBL 20 mL 0 mL     Urine Not recorded 200 mL     Vitals:   Vitals:    12/08/21 2030 12/08/21 2045 12/08/21 2100 12/08/21 2133   BP: (!) 147/96 (!) 159/98 (!) 152/96 135/85   Pulse: 100 99 93 97   Resp:   15 16   Temp:   97.9 °F (36.6 °C) 97.7 °F (36.5 °C)   TempSrc:   Temporal Oral   SpO2:   94% 95%   Weight:       Height:           Physical Exam:  Post-op vital signs:  Stable   General appearance: alert, no acute distress, grooming appropriate  Eyes: No scleral icterus, EOM grossly intact  Neck: trachea midline, no JVD, no lymphadenopathy, neck supple  Chest/Lungs: normal effort with no accessory muscle use on 3L NC,  Cardiovascular: RRR, brisk capillary refill distally  Abdomen: soft, appropriately tender, TTP improved, packing in place with some sanguinous strikethrough of overlying dressing  Skin: warm and dry, no rashes  Extremities: no edema, no cyanosis  Neuro: A&Ox3, no focal deficits, sensation intact    Assessment and Plan  This is a 73y.o. year old male status post open umbilical hernia repair with mesh secondary to incarcerated umbilical hernia with bowel. (12/8) POD0.     Pain management: Roxicodone pain panel, Dilaudid pain panel and scheduled tylenol   Cardiovasc: hemodynamically stable, will continue to monitor  Respiratory:  IS ordered to bedside, encourage hourly IS and deep breathing, wean oxygen as tolerated  Fluids:  , Diet: Clear liquid diet  : gustafson removed and patient voided  Ambulation: OOB to chair, encourage ambulation  Prophylaxis: SCDs, lovenox  Antibiotics: Rocephin/ Flagyl for 24 hours post-op  Wound: Local wound care    [facility]  PGY2, General Surgery  12/09/21  4:11 AM  [phone]

## 2021-12-09 NOTE — PROGRESS NOTES
Patient to pacu 7 s/p OPEN UMBILICAL HERNIA REPAIR WITH MESH AND POSSIBLE SMALL BOWEL RESECTION, report received from Dr Kristopher Leroy, reported hemodynamically stable intra op. Patient writhing around bed at this time. Pt medicated by Dr Kristopher Leroy see Camron Joya. Pt hypertensive at this time. Dr Zeina Kent at bedside, patients upper abdomen slightly protrude over dressing, renata made aware. Abdomen is soft.

## 2021-12-09 NOTE — PROGRESS NOTES
Surgery Daily Progress Note  [place]  CC:  Incarcerated umbilical hernia repair  Subjective :  Pain 4/10. Pain with movement. Much improved than before surgery. No N/V. Has only taken liquids overnight. No Gas      Objective    Infusions:   sodium chloride 25 mL (12/09/21 0618)    sodium chloride      dextrose          I/O:I/O last 3 completed shifts: In: 1000 [I.V.:1000]  Out: 250 [Urine:250]           Wt Readings from Last 1 Encounters:   12/08/21 234 lb (106.1 kg)                 LABS:    Recent Labs     12/08/21  1031 12/09/21  0458   WBC 12.0* 7.0   HGB 16.1 12.7*   HCT 46.9 36.7*   MCV 95.1 96.1    169        Recent Labs     12/08/21  1031 12/09/21  0458   * 137   K 4.3 4.6   CL 95* 100   CO2 22 26   PHOS  --  3.7   BUN 15 19   CREATININE 0.8 0.8        Recent Labs     12/08/21  1031   AST 21   ALT 21   BILIDIR <0.2   BILITOT 0.9   ALKPHOS 71        Recent Labs     12/08/21  1031   LIPASE 29.0        Recent Labs     12/08/21  1031 12/08/21  1434   PROT 7.6  --    INR  --  1.00        Recent Labs     12/08/21  1031   TROPONINI <0.01           Exam:/79   Pulse 80   Temp 98.1 °F (36.7 °C) (Oral)   Resp 16   Ht 6' 3\" (1.905 m)   Wt 234 lb (106.1 kg)   SpO2 93%   BMI 29.25 kg/m²   General appearance: alert, appears stated age and cooperative  Lungs: symmetrical  Heart: regular rate and rhythm, S1, S2   Abdomen: soft, appropriately-tender; bowel sounds present  Incisions C/D/I-erythema resolved. Mehreen in place      ASSESSMENT/PLAN: Pt. is a 73 y.o. male s/p  status post open umbilical hernia repair with mesh secondary to incarcerated umbilical hernia with bowel. (12/8) POD#1  Continue to advance mehreen daily  Continue clear liquids due to extensive surgery and concerns for anticipated ileus  Flagyl to be completed this morning    UMER Rueda - CNP 12/9/2021 6:41 AM  [phone]    I have seen, examined, and reviewed the patients chart.  I agree with the residents assessment and have made appropriate changes.     Carol Vaz

## 2021-12-09 NOTE — PROGRESS NOTES
POC  Patient checked on throughout the night for serial abdominal exams. Patient endorses mild incisional pain but states his pain is much better than prior to surgery. Denies any radiating pain. On physical exam he is appropriately tender with no signs of peritonitis.     [address] [facility], PGY2  General Surgery  12/09/21  5:05 AM  [phone]

## 2021-12-09 NOTE — ANESTHESIA POSTPROCEDURE EVALUATION
Department of Anesthesiology  Postprocedure Note    Patient: Nadia Rios  MRN: [id number]  YOB: 1955  Date of evaluation: 12/8/2021  Time:  10:07 PM     Procedure Summary     Date: 12/08/21 Room / Location: [address] [place] [address] [address] / [facility]    Anesthesia Start: 1726 Anesthesia Stop: 1942    Procedure: OPEN UMBILICAL HERNIA REPAIR WITH MESH AND POSSIBLE SMALL BOWEL RESECTION (N/A Abdomen) Diagnosis: (INCARCERATED UMBILICAL HERNIA)    Surgeons: Zayda Jj MD Responsible Provider: Yovanny Funes DO    Anesthesia Type: general ASA Status: 3 - Emergent          Anesthesia Type: general    Bibiana Phase I: Bibiana Score: 8    Bibiana Phase II:      Last vitals: Reviewed and per EMR flowsheets.        Anesthesia Post Evaluation    Patient location during evaluation: PACU  Patient participation: complete - patient participated  Level of consciousness: awake and alert  Pain score: 4  Airway patency: patent  Nausea & Vomiting: no nausea and no vomiting  Cardiovascular status: blood pressure returned to baseline  Respiratory status: acceptable  Hydration status: euvolemic

## 2021-12-09 NOTE — BRIEF OP NOTE
Brief Postoperative Note      Patient: Vinayak Gloria  YOB: 1955  MRN: [id number]    Date of Procedure: 12/8/2021    Pre-Op Diagnosis: INCARCERATED UMBILICAL HERNIA    Post-Op Diagnosis: Same       Procedure(s):  OPEN UMBILICAL HERNIA REPAIR WITH MESH AND POSSIBLE SMALL BOWEL RESECTION    Surgeon(s):  Abiodun Shah MD    Assistant:  Surgical Assistant: Mk Aggarwal  Resident: Claudette Chyle, MD    Anesthesia: General    Estimated Blood Loss (mL): 51TO    Complications: None    Specimens:   ID Type Source Tests Collected by Time Destination   A : [facility] Tissue SURGICAL PATHOLOGY Abiodun Shah MD 12/8/2021 1814        Implants:  * No implants in log *          Findings:   Incarcerated bowel within umbilical hernia repair in retrorectus fashion with bioA mesh      Electronically signed by Claudette Chyle, MD on 12/8/2021 at 7:45 PM

## 2021-12-09 NOTE — PROGRESS NOTES
Pt vs stable. A/O Pt has ambulated hallway and has sat in chair. Pt was educated to use IS and cough/deep breath. Pt was also told that he needs to move to prevent increase soreness. Pt has been taking acetaminophen for pain control and refused stronger medication he is afraid of being addicted to it. Pt was instructed that it is ok if his pain is too bad to have other option. Pt is tolerating diet and passing gas. Call light/personal items within reach. Will continue to monitor.

## 2021-12-10 LAB
ALBUMIN SERPL-MCNC: 3.7 G/DL (ref 3.4–5)
ANION GAP SERPL CALCULATED.3IONS-SCNC: 10 MMOL/L (ref 3–16)
BASOPHILS ABSOLUTE: 0 K/UL (ref 0–0.2)
BASOPHILS RELATIVE PERCENT: 0.5 %
BUN BLDV-MCNC: 17 MG/DL (ref 7–20)
CALCIUM SERPL-MCNC: 7.8 MG/DL (ref 8.3–10.6)
CHLORIDE BLD-SCNC: 96 MMOL/L (ref 99–110)
CO2: 25 MMOL/L (ref 21–32)
CREAT SERPL-MCNC: 0.8 MG/DL (ref 0.8–1.3)
EOSINOPHILS ABSOLUTE: 0.1 K/UL (ref 0–0.6)
EOSINOPHILS RELATIVE PERCENT: 1.5 %
GFR AFRICAN AMERICAN: >60
GFR NON-AFRICAN AMERICAN: >60
GLUCOSE BLD-MCNC: 111 MG/DL (ref 70–99)
GLUCOSE BLD-MCNC: 129 MG/DL (ref 70–99)
GLUCOSE BLD-MCNC: 141 MG/DL (ref 70–99)
GLUCOSE BLD-MCNC: 142 MG/DL (ref 70–99)
GLUCOSE BLD-MCNC: 154 MG/DL (ref 70–99)
HCT VFR BLD CALC: 35.5 % (ref 40.5–52.5)
HEMOGLOBIN: 12.1 G/DL (ref 13.5–17.5)
LYMPHOCYTES ABSOLUTE: 1.4 K/UL (ref 1–5.1)
LYMPHOCYTES RELATIVE PERCENT: 17.1 %
MAGNESIUM: 1.9 MG/DL (ref 1.8–2.4)
MCH RBC QN AUTO: 32.9 PG (ref 26–34)
MCHC RBC AUTO-ENTMCNC: 34.2 G/DL (ref 31–36)
MCV RBC AUTO: 96 FL (ref 80–100)
MONOCYTES ABSOLUTE: 0.8 K/UL (ref 0–1.3)
MONOCYTES RELATIVE PERCENT: 9.5 %
NEUTROPHILS ABSOLUTE: 5.8 K/UL (ref 1.7–7.7)
NEUTROPHILS RELATIVE PERCENT: 71.4 %
PDW BLD-RTO: 13.1 % (ref 12.4–15.4)
PERFORMED ON: ABNORMAL
PHOSPHORUS: 2.3 MG/DL (ref 2.5–4.9)
PLATELET # BLD: 170 K/UL (ref 135–450)
PMV BLD AUTO: 7.9 FL (ref 5–10.5)
POTASSIUM SERPL-SCNC: 4 MMOL/L (ref 3.5–5.1)
RBC # BLD: 3.69 M/UL (ref 4.2–5.9)
SODIUM BLD-SCNC: 131 MMOL/L (ref 136–145)
WBC # BLD: 8.2 K/UL (ref 4–11)

## 2021-12-10 PROCEDURE — 85025 COMPLETE CBC W/AUTO DIFF WBC: CPT

## 2021-12-10 PROCEDURE — 99024 POSTOP FOLLOW-UP VISIT: CPT | Performed by: SURGERY

## 2021-12-10 PROCEDURE — 80069 RENAL FUNCTION PANEL: CPT

## 2021-12-10 PROCEDURE — 6370000000 HC RX 637 (ALT 250 FOR IP): Performed by: STUDENT IN AN ORGANIZED HEALTH CARE EDUCATION/TRAINING PROGRAM

## 2021-12-10 PROCEDURE — 6360000002 HC RX W HCPCS: Performed by: STUDENT IN AN ORGANIZED HEALTH CARE EDUCATION/TRAINING PROGRAM

## 2021-12-10 PROCEDURE — 2580000003 HC RX 258: Performed by: STUDENT IN AN ORGANIZED HEALTH CARE EDUCATION/TRAINING PROGRAM

## 2021-12-10 PROCEDURE — 1200000000 HC SEMI PRIVATE

## 2021-12-10 PROCEDURE — 36415 COLL VENOUS BLD VENIPUNCTURE: CPT

## 2021-12-10 PROCEDURE — 83735 ASSAY OF MAGNESIUM: CPT

## 2021-12-10 RX ADMIN — PRAVASTATIN SODIUM 20 MG: 20 TABLET ORAL at 09:22

## 2021-12-10 RX ADMIN — DIBASIC SODIUM PHOSPHATE, MONOBASIC POTASSIUM PHOSPHATE AND MONOBASIC SODIUM PHOSPHATE 2 TABLET: 852; 155; 130 TABLET ORAL at 09:22

## 2021-12-10 RX ADMIN — SODIUM CHLORIDE, PRESERVATIVE FREE 10 ML: 5 INJECTION INTRAVENOUS at 09:22

## 2021-12-10 RX ADMIN — ACETAMINOPHEN 1000 MG: 500 TABLET ORAL at 06:18

## 2021-12-10 RX ADMIN — ACETAMINOPHEN 1000 MG: 500 TABLET ORAL at 22:13

## 2021-12-10 RX ADMIN — SODIUM CHLORIDE, PRESERVATIVE FREE 10 ML: 5 INJECTION INTRAVENOUS at 22:13

## 2021-12-10 RX ADMIN — ENOXAPARIN SODIUM 40 MG: 100 INJECTION SUBCUTANEOUS at 09:22

## 2021-12-10 RX ADMIN — ACETAMINOPHEN 1000 MG: 500 TABLET ORAL at 14:32

## 2021-12-10 RX ADMIN — SODIUM CHLORIDE, PRESERVATIVE FREE 10 ML: 5 INJECTION INTRAVENOUS at 09:30

## 2021-12-10 RX ADMIN — INSULIN HUMAN 2 UNITS: 100 INJECTION, SOLUTION PARENTERAL at 13:05

## 2021-12-10 ASSESSMENT — PAIN DESCRIPTION - LOCATION
LOCATION: ABDOMEN
LOCATION: ABDOMEN

## 2021-12-10 ASSESSMENT — PAIN DESCRIPTION - PAIN TYPE
TYPE: SURGICAL PAIN
TYPE: ACUTE PAIN

## 2021-12-10 ASSESSMENT — PAIN SCALES - GENERAL
PAINLEVEL_OUTOF10: 4
PAINLEVEL_OUTOF10: 0
PAINLEVEL_OUTOF10: 3
PAINLEVEL_OUTOF10: 2

## 2021-12-10 ASSESSMENT — PAIN DESCRIPTION - FREQUENCY
FREQUENCY: INTERMITTENT
FREQUENCY: INTERMITTENT

## 2021-12-10 ASSESSMENT — PAIN DESCRIPTION - DESCRIPTORS
DESCRIPTORS: SHARP
DESCRIPTORS: CRAMPING

## 2021-12-10 ASSESSMENT — PAIN DESCRIPTION - ONSET
ONSET: GRADUAL
ONSET: ON-GOING

## 2021-12-10 ASSESSMENT — PAIN DESCRIPTION - PROGRESSION: CLINICAL_PROGRESSION: NOT CHANGED

## 2021-12-10 ASSESSMENT — PAIN - FUNCTIONAL ASSESSMENT: PAIN_FUNCTIONAL_ASSESSMENT: ACTIVITIES ARE NOT PREVENTED

## 2021-12-10 ASSESSMENT — PAIN DESCRIPTION - ORIENTATION
ORIENTATION: MID
ORIENTATION: MID

## 2021-12-10 NOTE — PLAN OF CARE
Problem: Pain:  Goal: Pain level will decrease  Description: Pain level will decrease  Outcome: Ongoing  Note: Pt has been with little pain and he receives tylenol for pain. Problem: Skin Integrity:  Goal: Absence of new skin breakdown  Description: Absence of new skin breakdown  Outcome: Ongoing  Note: Pt is able to turn self and ambulates hallway.

## 2021-12-10 NOTE — PLAN OF CARE
Problem: Pain:  Goal: Pain level will decrease  12/10/2021 0342 by Hilario Hicks RN  Outcome: Met This Shift     Problem: Pain:  Goal: Control of acute pain  Outcome: Met This Shift     Problem: Skin Integrity:  Goal: Will show no infection signs and symptoms  12/10/2021 0342 by Hilario Hicks RN  Outcome: Ongoing

## 2021-12-10 NOTE — PROGRESS NOTES
Surgery Daily Progress Note  Jessica Mckeon  CC:  Incarcerated umbilical hernia repair  Subjective :   No acute issus overnight. Complains of mild abdominal pain. Tolerating CLD, No N/V, Passing gas no BMS. Up and ambulating. Objective    Infusions:   sodium chloride Stopped (12/09/21 1416)    sodium chloride      dextrose          I/O:I/O last 3 completed shifts: In: 527.9 [P.O.:240; I.V.:87.9; IV Piggyback:200]  Out: 850 [Urine:850]           Wt Readings from Last 1 Encounters:   12/08/21 234 lb (106.1 kg)                 LABS:    Recent Labs     12/08/21  1031 12/09/21  0458   WBC 12.0* 7.0   HGB 16.1 12.7*   HCT 46.9 36.7*   MCV 95.1 96.1    169        Recent Labs     12/08/21  1031 12/09/21  0458   * 137   K 4.3 4.6   CL 95* 100   CO2 22 26   PHOS  --  3.7   BUN 15 19   CREATININE 0.8 0.8        Recent Labs     12/08/21  1031   AST 21   ALT 21   BILIDIR <0.2   BILITOT 0.9   ALKPHOS 71        Recent Labs     12/08/21  1031   LIPASE 29.0        Recent Labs     12/08/21  1031 12/08/21  1434   PROT 7.6  --    INR  --  1.00        Recent Labs     12/08/21  1031   TROPONINI <0.01           Exam:BP (!) 158/82   Pulse 85   Temp 98.7 °F (37.1 °C) (Oral)   Resp 18   Ht 6' 3\" (1.905 m)   Wt 234 lb (106.1 kg)   SpO2 95%   BMI 29.25 kg/m²   General appearance: alert, appears stated age and cooperative  Lungs: symmetrical  Heart: regular rate and rhythm, S1, S2   Abdomen: soft, appropriately-tender, distended at baseline; bowel sounds present  Incisions C/D/I-erythema resolved. Mehreen in place with SS drainage. Echymosis around umbilicus. ASSESSMENT/PLAN: Pt. is a 73 y.o. male s/p  status post open umbilical hernia repair with mesh secondary to incarcerated umbilical hernia with bowel.  (12/8) POD#2  Continue to advance mehreen daily  Continue clear liquids due to extensive surgery and concerns for anticipated ileus, awaiting full return of bowel function prior to advancing    Serjio Moore Reese Blair DO 12/10/2021 6:14 AM  [phone]    I have seen, examined, and reviewed the patients chart. I agree with the residents assessment and have made appropriate changes.     Kristina Pang

## 2021-12-10 NOTE — PROGRESS NOTES
Patient AOx4. VSS. No acute distress noted. Patient up to the bathroom this shift. Patient was encouraged to move, use IS, and cough/deep breath. Patient refusing anything stronger than Tylenol thus far. Educated patient not to let pain get to bad before he asks for something else. Patient tolerating clear liquids. Bed in lowest position. SCD's on. Bedside table and call light within reach. Bed alarm on. Will continue to monitor.

## 2021-12-10 NOTE — PROGRESS NOTES
Vs stable A/O pt has ambulated hallway by himself tolerated it well. Pt pain level has been no pain to 2/10. Pt has been sitting up to chair most of the day. Will continue to monitor.

## 2021-12-11 LAB
ALBUMIN SERPL-MCNC: 3.6 G/DL (ref 3.4–5)
ANION GAP SERPL CALCULATED.3IONS-SCNC: 12 MMOL/L (ref 3–16)
BASOPHILS ABSOLUTE: 0 K/UL (ref 0–0.2)
BASOPHILS RELATIVE PERCENT: 0.4 %
BUN BLDV-MCNC: 13 MG/DL (ref 7–20)
CALCIUM SERPL-MCNC: 7.7 MG/DL (ref 8.3–10.6)
CHLORIDE BLD-SCNC: 96 MMOL/L (ref 99–110)
CO2: 25 MMOL/L (ref 21–32)
CREAT SERPL-MCNC: 0.6 MG/DL (ref 0.8–1.3)
EOSINOPHILS ABSOLUTE: 0.2 K/UL (ref 0–0.6)
EOSINOPHILS RELATIVE PERCENT: 2.7 %
GFR AFRICAN AMERICAN: >60
GFR NON-AFRICAN AMERICAN: >60
GLUCOSE BLD-MCNC: 114 MG/DL (ref 70–99)
GLUCOSE BLD-MCNC: 115 MG/DL (ref 70–99)
GLUCOSE BLD-MCNC: 115 MG/DL (ref 70–99)
GLUCOSE BLD-MCNC: 131 MG/DL (ref 70–99)
GLUCOSE BLD-MCNC: 142 MG/DL (ref 70–99)
HCT VFR BLD CALC: 34.6 % (ref 40.5–52.5)
HEMOGLOBIN: 11.8 G/DL (ref 13.5–17.5)
LYMPHOCYTES ABSOLUTE: 1.4 K/UL (ref 1–5.1)
LYMPHOCYTES RELATIVE PERCENT: 18 %
MAGNESIUM: 1.9 MG/DL (ref 1.8–2.4)
MCH RBC QN AUTO: 32.5 PG (ref 26–34)
MCHC RBC AUTO-ENTMCNC: 33.9 G/DL (ref 31–36)
MCV RBC AUTO: 95.8 FL (ref 80–100)
MONOCYTES ABSOLUTE: 0.7 K/UL (ref 0–1.3)
MONOCYTES RELATIVE PERCENT: 9 %
NEUTROPHILS ABSOLUTE: 5.5 K/UL (ref 1.7–7.7)
NEUTROPHILS RELATIVE PERCENT: 69.9 %
PDW BLD-RTO: 13 % (ref 12.4–15.4)
PERFORMED ON: ABNORMAL
PHOSPHORUS: 1.8 MG/DL (ref 2.5–4.9)
PLATELET # BLD: 174 K/UL (ref 135–450)
PMV BLD AUTO: 7.8 FL (ref 5–10.5)
POTASSIUM SERPL-SCNC: 3.8 MMOL/L (ref 3.5–5.1)
RBC # BLD: 3.62 M/UL (ref 4.2–5.9)
SODIUM BLD-SCNC: 133 MMOL/L (ref 136–145)
WBC # BLD: 7.8 K/UL (ref 4–11)

## 2021-12-11 PROCEDURE — 6370000000 HC RX 637 (ALT 250 FOR IP): Performed by: STUDENT IN AN ORGANIZED HEALTH CARE EDUCATION/TRAINING PROGRAM

## 2021-12-11 PROCEDURE — 1200000000 HC SEMI PRIVATE

## 2021-12-11 PROCEDURE — 99024 POSTOP FOLLOW-UP VISIT: CPT | Performed by: SURGERY

## 2021-12-11 PROCEDURE — 2580000003 HC RX 258: Performed by: STUDENT IN AN ORGANIZED HEALTH CARE EDUCATION/TRAINING PROGRAM

## 2021-12-11 PROCEDURE — 83735 ASSAY OF MAGNESIUM: CPT

## 2021-12-11 PROCEDURE — 94150 VITAL CAPACITY TEST: CPT

## 2021-12-11 PROCEDURE — 80069 RENAL FUNCTION PANEL: CPT

## 2021-12-11 PROCEDURE — 36415 COLL VENOUS BLD VENIPUNCTURE: CPT

## 2021-12-11 PROCEDURE — 6370000000 HC RX 637 (ALT 250 FOR IP)

## 2021-12-11 PROCEDURE — 85025 COMPLETE CBC W/AUTO DIFF WBC: CPT

## 2021-12-11 PROCEDURE — 6360000002 HC RX W HCPCS: Performed by: STUDENT IN AN ORGANIZED HEALTH CARE EDUCATION/TRAINING PROGRAM

## 2021-12-11 RX ADMIN — ACETAMINOPHEN 1000 MG: 500 TABLET ORAL at 22:19

## 2021-12-11 RX ADMIN — PRAVASTATIN SODIUM 20 MG: 20 TABLET ORAL at 09:29

## 2021-12-11 RX ADMIN — ACETAMINOPHEN 1000 MG: 500 TABLET ORAL at 14:48

## 2021-12-11 RX ADMIN — ENOXAPARIN SODIUM 40 MG: 100 INJECTION SUBCUTANEOUS at 09:29

## 2021-12-11 RX ADMIN — SODIUM CHLORIDE, PRESERVATIVE FREE 10 ML: 5 INJECTION INTRAVENOUS at 22:28

## 2021-12-11 RX ADMIN — ACETAMINOPHEN 1000 MG: 500 TABLET ORAL at 06:47

## 2021-12-11 RX ADMIN — SODIUM CHLORIDE, PRESERVATIVE FREE 10 ML: 5 INJECTION INTRAVENOUS at 09:30

## 2021-12-11 RX ADMIN — SODIUM CHLORIDE, PRESERVATIVE FREE 10 ML: 5 INJECTION INTRAVENOUS at 22:20

## 2021-12-11 RX ADMIN — DIBASIC SODIUM PHOSPHATE, MONOBASIC POTASSIUM PHOSPHATE AND MONOBASIC SODIUM PHOSPHATE 2 TABLET: 852; 155; 130 TABLET ORAL at 12:27

## 2021-12-11 RX ADMIN — DIBASIC SODIUM PHOSPHATE, MONOBASIC POTASSIUM PHOSPHATE AND MONOBASIC SODIUM PHOSPHATE 2 TABLET: 852; 155; 130 TABLET ORAL at 22:25

## 2021-12-11 RX ADMIN — SODIUM CHLORIDE, PRESERVATIVE FREE 10 ML: 5 INJECTION INTRAVENOUS at 22:27

## 2021-12-11 RX ADMIN — SODIUM CHLORIDE, PRESERVATIVE FREE 10 ML: 5 INJECTION INTRAVENOUS at 22:21

## 2021-12-11 ASSESSMENT — PAIN SCALES - GENERAL
PAINLEVEL_OUTOF10: 2
PAINLEVEL_OUTOF10: 3
PAINLEVEL_OUTOF10: 2
PAINLEVEL_OUTOF10: 2
PAINLEVEL_OUTOF10: 3
PAINLEVEL_OUTOF10: 0
PAINLEVEL_OUTOF10: 2
PAINLEVEL_OUTOF10: 2

## 2021-12-11 NOTE — PROGRESS NOTES
VSS. Pt has been sleeping for intervals. Pt reports pain is controlled with scheduled tylenol. Pt tolerating clear liquids. Pt ambulating independently with steady gait. Pt denies needs. Call light in reach.   Electronically signed by Marie Butler RN on 12/11/21 at 1:22 AM EST

## 2021-12-11 NOTE — PROGRESS NOTES
Surgery Daily Progress Note  Janeen Interiano  CC:  Incarcerated umbilical hernia repair  Subjective :   No issues overnight, patient rested well. Patient remains afebrile and HDS. States pain is well-controlled with Tylenol. Reports passing gas, no bowel movements. Tolerating clears, denies nausea/ vomiting. States appetite. Up and ambulating the halls. Objective    Infusions:   sodium chloride Stopped (12/09/21 1416)    sodium chloride      dextrose          I/O:I/O last 3 completed shifts: In: 980 [P.O.:960; I.V.:20]  Out: 575 [Urine:575]           Wt Readings from Last 1 Encounters:   12/08/21 234 lb (106.1 kg)                 LABS:    Recent Labs     12/09/21  0458 12/10/21  0538   WBC 7.0 8.2   HGB 12.7* 12.1*   HCT 36.7* 35.5*   MCV 96.1 96.0    170        Recent Labs     12/09/21  0458 12/10/21  0538    131*   K 4.6 4.0    96*   CO2 26 25   PHOS 3.7 2.3*   BUN 19 17   CREATININE 0.8 0.8        Recent Labs     12/08/21  1031   AST 21   ALT 21   BILIDIR <0.2   BILITOT 0.9   ALKPHOS 71        Recent Labs     12/08/21  1031   LIPASE 29.0        Recent Labs     12/08/21  1031 12/08/21  1434   PROT 7.6  --    INR  --  1.00        Recent Labs     12/08/21  1031   TROPONINI <0.01           Exam:/73   Pulse 76   Temp 98 °F (36.7 °C) (Oral)   Resp 18   Ht 6' 3\" (1.905 m)   Wt 234 lb (106.1 kg)   SpO2 95%   BMI 29.25 kg/m²     General appearance: alert, appears stated age and cooperative  Lungs: symmetrical  Heart: regular rate and rhythm  Abdomen: soft, appropriately-tender, distended at baseline; bowel sounds present  Incisions C/D/I-erythema resolved. Mehreen in place with SS drainage. Echymosis around umbilicus. Bowel sounds present      ASSESSMENT/PLAN: Pt. is a 73 y.o. male s/p  status post open umbilical hernia repair with mesh secondary to incarcerated umbilical hernia with bowel.  (12/8) POD#3    - Continue to advance mehreen daily  - Patient with appetite, will advance to fulls  - Continue PO pain medications  - Encourage OOB and ambulation  - Anticipate discharge in 1-2 days pending diet advancement    Geovani Diallo DO  PGY1, General Surgery  12/11/21  7:08 AM  [phone]    I have seen, examined, and reviewed the patients chart. I agree with the residents assessment and have made appropriate changes.     Richard Jacome

## 2021-12-11 NOTE — PROGRESS NOTES
Ambulated in [place], steady gait. Tolerated diet well for lunch. Passing gas, no BM. Rates pain \"3\".

## 2021-12-11 NOTE — PROGRESS NOTES
Passing gas no BM, diet advanced starting @ lunch. Up in chair denied pain presently. Ambulates on own and OOB encouraged.  VSS

## 2021-12-12 VITALS
WEIGHT: 234 LBS | DIASTOLIC BLOOD PRESSURE: 81 MMHG | HEIGHT: 75 IN | OXYGEN SATURATION: 99 % | SYSTOLIC BLOOD PRESSURE: 135 MMHG | RESPIRATION RATE: 16 BRPM | BODY MASS INDEX: 29.09 KG/M2 | HEART RATE: 88 BPM | TEMPERATURE: 97.4 F

## 2021-12-12 LAB
ALBUMIN SERPL-MCNC: 3.7 G/DL (ref 3.4–5)
ANION GAP SERPL CALCULATED.3IONS-SCNC: 12 MMOL/L (ref 3–16)
BASOPHILS ABSOLUTE: 0 K/UL (ref 0–0.2)
BASOPHILS RELATIVE PERCENT: 0.4 %
BUN BLDV-MCNC: 12 MG/DL (ref 7–20)
CALCIUM SERPL-MCNC: 7.6 MG/DL (ref 8.3–10.6)
CHLORIDE BLD-SCNC: 97 MMOL/L (ref 99–110)
CO2: 25 MMOL/L (ref 21–32)
CREAT SERPL-MCNC: 0.6 MG/DL (ref 0.8–1.3)
EOSINOPHILS ABSOLUTE: 0.2 K/UL (ref 0–0.6)
EOSINOPHILS RELATIVE PERCENT: 2.6 %
GFR AFRICAN AMERICAN: >60
GFR NON-AFRICAN AMERICAN: >60
GLUCOSE BLD-MCNC: 127 MG/DL (ref 70–99)
GLUCOSE BLD-MCNC: 138 MG/DL (ref 70–99)
GLUCOSE BLD-MCNC: 99 MG/DL (ref 70–99)
HCT VFR BLD CALC: 33.8 % (ref 40.5–52.5)
HEMOGLOBIN: 11.7 G/DL (ref 13.5–17.5)
LYMPHOCYTES ABSOLUTE: 1.6 K/UL (ref 1–5.1)
LYMPHOCYTES RELATIVE PERCENT: 23 %
MAGNESIUM: 1.8 MG/DL (ref 1.8–2.4)
MCH RBC QN AUTO: 32.8 PG (ref 26–34)
MCHC RBC AUTO-ENTMCNC: 34.7 G/DL (ref 31–36)
MCV RBC AUTO: 94.5 FL (ref 80–100)
MONOCYTES ABSOLUTE: 0.6 K/UL (ref 0–1.3)
MONOCYTES RELATIVE PERCENT: 8.9 %
NEUTROPHILS ABSOLUTE: 4.5 K/UL (ref 1.7–7.7)
NEUTROPHILS RELATIVE PERCENT: 65.1 %
PDW BLD-RTO: 13 % (ref 12.4–15.4)
PERFORMED ON: ABNORMAL
PERFORMED ON: NORMAL
PHOSPHORUS: 2 MG/DL (ref 2.5–4.9)
PLATELET # BLD: 190 K/UL (ref 135–450)
PMV BLD AUTO: 7.7 FL (ref 5–10.5)
POTASSIUM SERPL-SCNC: 4 MMOL/L (ref 3.5–5.1)
RBC # BLD: 3.57 M/UL (ref 4.2–5.9)
SODIUM BLD-SCNC: 134 MMOL/L (ref 136–145)
WBC # BLD: 6.9 K/UL (ref 4–11)

## 2021-12-12 PROCEDURE — 83735 ASSAY OF MAGNESIUM: CPT

## 2021-12-12 PROCEDURE — 36415 COLL VENOUS BLD VENIPUNCTURE: CPT

## 2021-12-12 PROCEDURE — 85025 COMPLETE CBC W/AUTO DIFF WBC: CPT

## 2021-12-12 PROCEDURE — 99024 POSTOP FOLLOW-UP VISIT: CPT | Performed by: SURGERY

## 2021-12-12 PROCEDURE — 80069 RENAL FUNCTION PANEL: CPT

## 2021-12-12 PROCEDURE — 6360000002 HC RX W HCPCS: Performed by: STUDENT IN AN ORGANIZED HEALTH CARE EDUCATION/TRAINING PROGRAM

## 2021-12-12 PROCEDURE — 6370000000 HC RX 637 (ALT 250 FOR IP): Performed by: STUDENT IN AN ORGANIZED HEALTH CARE EDUCATION/TRAINING PROGRAM

## 2021-12-12 PROCEDURE — 2580000003 HC RX 258: Performed by: STUDENT IN AN ORGANIZED HEALTH CARE EDUCATION/TRAINING PROGRAM

## 2021-12-12 RX ORDER — BISACODYL 10 MG
10 SUPPOSITORY, RECTAL RECTAL DAILY PRN
Status: DISCONTINUED | OUTPATIENT
Start: 2021-12-12 | End: 2021-12-12 | Stop reason: HOSPADM

## 2021-12-12 RX ADMIN — SODIUM CHLORIDE, PRESERVATIVE FREE 10 ML: 5 INJECTION INTRAVENOUS at 08:20

## 2021-12-12 RX ADMIN — SODIUM CHLORIDE, PRESERVATIVE FREE 20 ML: 5 INJECTION INTRAVENOUS at 08:18

## 2021-12-12 RX ADMIN — ACETAMINOPHEN 1000 MG: 500 TABLET ORAL at 06:55

## 2021-12-12 RX ADMIN — ENOXAPARIN SODIUM 40 MG: 100 INJECTION SUBCUTANEOUS at 08:18

## 2021-12-12 RX ADMIN — PRAVASTATIN SODIUM 20 MG: 20 TABLET ORAL at 08:18

## 2021-12-12 ASSESSMENT — PAIN SCALES - GENERAL
PAINLEVEL_OUTOF10: 2
PAINLEVEL_OUTOF10: 3
PAINLEVEL_OUTOF10: 0
PAINLEVEL_OUTOF10: 0

## 2021-12-12 NOTE — PROGRESS NOTES
Had small BM earlier with 3 small formed balls of brown stool. Patient stated he's rather not get suppository @ this time. Loretta Aikenuise called and updated. Stated they will sent patient home & will come up change dressing/inderjit write instructions/scripts. Patient updated and aware.

## 2021-12-12 NOTE — PROGRESS NOTES
Denies pain, passing gas no BM, will give suppository per order. Tolerating diet well, ambulating in halls with steady gait, VSS.

## 2021-12-12 NOTE — PROGRESS NOTES
patient has full return of bowel function    Jeromy Cam DO  PGY1, General Surgery  12/12/21  6:38 AM  [phone]    I have seen, examined, and reviewed the patients chart. I agree with the residents assessment and have made appropriate changes.     Arnie Altman

## 2021-12-12 NOTE — PROGRESS NOTES
Discharge instructions given to patient verbalized an understanding, eager to go home. Instructed to see  in 7-10 days, dressing changed per . Discharged home per w/c picked up per brother.

## 2021-12-13 NOTE — DISCHARGE SUMMARY
Discharge Summary      Patient:    Marquis Verdugo Date:   12/8/2021 10:11 AM    Discharge Date:   12/12/2021    Admitting Physician:   Logan Corado MD     Discharge Physician:   same    Admitting Diagnosis:  Incarcerated umbilical hernia    Discharge Diagnosis:   same    Past Medical History:   Diagnosis Date    Cancer [facility])     prostate    Hyperglycemia     Hyperlipidemia     Knee injury     right knee        Indication for Admission:   Patient presented to the [address] [place] [address] with 5 days of pair umbilical abdominal pain. Patient had a known umbilical hernia that until this point was able to be easily reduced. On this particular occasion, the patient was unable to reduce the hernia, and was noted to have periumbilical erythema. Patient was thus taken to the OR urgently. Hospital Course:   POD 0 -patient presented to the [facility] with an incarcerated medical hernia and was taken urgently to the OR for an open umbilical hernia repair with mesh. The patient recovered as expected in the postoperative period. Pain was well controlled. He is tolerating a clear liquid diet without any nausea or vomiting. Carpenter catheter was removed and patient was voiding independently. Patient was continued on Rocephin and Flagyl for 24 hours postoperatively. Abdominal exams completed overnight and patient continued to do well. POD 1 -patient's pain remained well controlled given concern for anticipated ileus patient remained on a clear liquid diet. POD 2 -patient continued on clear good diet and awaited return of bowel function prior to advancing diet. Patient had minimal abdominal pain that was well controlled with p.o. pain medications. The patient was able to get out of bed and ambulate without difficulty. POD 3 -patient continued to do well overnight patient pain was well controlled with as needed Tylenol. Patient began passing flatus however he had not had any bowel movements. Continued to tolerate clear liquid diet without any nausea or vomiting. Continued to easily ambulate and get out of bed. Patient was advanced to full liquid diet tolerated well without any nausea or vomiting. Mehreen that were packed within the patient's umbilical region were advanced slowly each day and cut. POD 4 -patient advanced to a general diet and tolerated well without any nausea or vomiting. Patient continued to pass flatus and had a bowel movement. Patient continued to ambulate without difficulty. Mehreen that were within the umbilical region were removed on this hospital day and patient was discharged home in stable condition with his brother. Discharge physical exam:  General appearance: alert, appears stated age and cooperative  Lungs: symmetrical  Heart: regular rate and rhythm  Abdomen: soft, appropriately-tender, distended at baseline; bowel sounds present  Incisions C/D/I-erythema resolved. Mehreen removed and dressed with 2 x 2 gauze and Medipore tape. Ecchymosis around umbilicus. Disposition:    Home    Condition at discharge:  Stable    Discharge Instructions:  See separate form    Patient Instructions:      Medication List      START taking these medications    docusate sodium 100 MG capsule  Commonly known as: COLACE  Take 1 capsule by mouth 2 times daily for 10 days     oxyCODONE 5 MG immediate release tablet  Commonly known as: Roxicodone  Take 1 tablet by mouth every 6 hours as needed for Pain for up to 7 days. CONTINUE taking these medications    fish oil-omega-3 fatty acids 1000 MG capsule     FreeStyle Lancets Misc     FREESTYLE LITE strip  Generic drug: blood glucose test strips     lisinopril 20 MG tablet  Commonly known as: PRINIVIL;ZESTRIL  Take 1 tablet by mouth daily     metFORMIN 500 MG tablet  Commonly known as: GLUCOPHAGE  Take 1 tablet by mouth daily (with breakfast)     metroNIDAZOLE 0.75 % gel  Commonly known as: METROGEL  Apply topically 2 times daily.

## 2021-12-20 ENCOUNTER — OFFICE VISIT (OUTPATIENT)
Dept: SURGERY | Age: 66
End: 2021-12-20

## 2021-12-20 VITALS
BODY MASS INDEX: 31.28 KG/M2 | TEMPERATURE: 97.1 F | WEIGHT: 251.6 LBS | HEIGHT: 75 IN | SYSTOLIC BLOOD PRESSURE: 138 MMHG | HEART RATE: 45 BPM | DIASTOLIC BLOOD PRESSURE: 83 MMHG

## 2021-12-20 DIAGNOSIS — Z09 POSTOP CHECK: Primary | ICD-10-CM

## 2021-12-20 PROCEDURE — 99024 POSTOP FOLLOW-UP VISIT: CPT | Performed by: SURGERY

## 2021-12-20 NOTE — PROGRESS NOTES
PATIENT NAME: Yovany Read     YOB: 1955     TODAY'S DATE: 12/20/2021    Reason for Visit:  Post-op check    Requesting Physician:  Dr. Chaitanya Pierson:              The patient is a 73 y.o. male with a PMHx as delineated below who presents for follow up without significant complaints.      Chief Complaint   Patient presents with    Post-Op Check     open umbilical hernia repair &possible bowel resection 12/8/21       REVIEW OF SYSTEMS:  CONSTITUTIONAL:  negative  HEENT:  negative  RESPIRATORY:  negative  CARDIOVASCULAR:  negative  GASTROINTESTINAL:  negative except for abdominal pain  GENITOURINARY:  negative  HEMATOLOGIC/LYMPHATIC:  negative  MUSCULOSKELETAL: negative  NEUROLOGICAL:  negative    PMH  Past Medical History:   Diagnosis Date    Cancer ([address]     prostate    Hyperglycemia     Hyperlipidemia     Knee injury     right knee       PSH  Past Surgical History:   Procedure Laterality Date    PROSTATE SURGERY      prostate removal    TONSILLECTOMY      UMBILICAL HERNIA REPAIR N/A 24/8/7824    OPEN UMBILICAL HERNIA REPAIR WITH MESH AND POSSIBLE SMALL BOWEL RESECTION performed by Lynn Duron MD at [address] [place] [address] History  Social History     Socioeconomic History    Marital status: Single     Spouse name: Not on file    Number of children: Not on file    Years of education: Not on file    Highest education level: Not on file   Occupational History    Not on file   Tobacco Use    Smoking status: Never Smoker    Smokeless tobacco: Never Used   Substance and Sexual Activity    Alcohol use: Yes     Comment: 2-3 glasses of wine/day    Drug use: No    Sexual activity: Not on file   Other Topics Concern    Not on file   Social History Narrative    Not on file     Social Determinants of Health     Financial Resource Strain: Low Risk     Difficulty of Paying Living Expenses: Not hard at all   Food Insecurity: No Food Insecurity    Worried About Running Out of Food in the Last Year: Never true    Ran Out of Food in the Last Year: Never true   Transportation Needs: No Transportation Needs    Lack of Transportation (Medical): No    Lack of Transportation (Non-Medical):  No   Physical Activity:     Days of Exercise per Week: Not on file    Minutes of Exercise per Session: Not on file   Stress:     Feeling of Stress : Not on file   Social Connections:     Frequency of Communication with Friends and Family: Not on file    Frequency of Social Gatherings with Friends and Family: Not on file    Attends Mandaeism Services: Not on file    Active Member of [address] [place] [address] or Organizations: Not on file    Attends Club or Organization Meetings: Not on file    Marital Status: Not on file   Intimate Partner Violence:     Fear of Current or Ex-Partner: Not on file    Emotionally Abused: Not on file    Physically Abused: Not on file    Sexually Abused: Not on file   Housing Stability:     Unable to Pay for Housing in the Last Year: Not on file    Number of [place] in the Last Year: Not on file    Unstable Housing in the Last Year: Not on file       Family History:       Problem Relation Age of Onset    Cancer Father         prostate       Allergy: No Known Allergies    PHYSICAL EXAM:  VITALS:  /83   Pulse (!) 45   Temp 97.1 °F (36.2 °C)   Ht 6' 3\" (1.905 m)   Wt 251 lb 9.6 oz (114.1 kg)   BMI 31.45 kg/m²     CONSTITUTIONAL:  alert, no apparent distress and moderately obese  EYES:  sclera clear  ENT:  normocepalic, without obvious abnormality  NECK:  supple, symmetrical, trachea midline and no carotid bruits  LUNGS:  clear to auscultation  CARDIOVASCULAR:  regular rate and rhythm and no murmur noted  ABDOMEN:  Periumbilical incision is healing well, normal bowel sounds, soft, non-distended, non-tender, voluntary guarding absent, no masses palpated and hernia absent  MUSCULOSKELETAL:  0+ pitting edema lower extremities  NEUROLOGIC:  Mental Status Exam:  Level of Alertness:   awake  Orientation:   person, place, time  SKIN:  no bruising or bleeding and normal skin color, texture, turgor    IMPRESSION/RECOMMENDATIONS:    Patient is s/p open ventral hernia repair. He is recovering well from surgery. His pain is minimal and he is returning to normal activities. We discussed his continued restrictions and I will see him back in the office on a prn basis.     Debi Samuels MD

## 2022-01-13 ENCOUNTER — OFFICE VISIT (OUTPATIENT)
Dept: PRIMARY CARE CLINIC | Age: 67
End: 2022-01-13
Payer: MEDICARE

## 2022-01-13 VITALS
SYSTOLIC BLOOD PRESSURE: 138 MMHG | DIASTOLIC BLOOD PRESSURE: 88 MMHG | OXYGEN SATURATION: 98 % | BODY MASS INDEX: 32.51 KG/M2 | WEIGHT: 260.13 LBS | TEMPERATURE: 98 F | HEART RATE: 75 BPM

## 2022-01-13 DIAGNOSIS — Z85.46 HISTORY OF PROSTATE CANCER: ICD-10-CM

## 2022-01-13 DIAGNOSIS — E11.9 CONTROLLED TYPE 2 DIABETES MELLITUS WITHOUT COMPLICATION, WITHOUT LONG-TERM CURRENT USE OF INSULIN (HCC): ICD-10-CM

## 2022-01-13 DIAGNOSIS — I10 ESSENTIAL HYPERTENSION: Primary | ICD-10-CM

## 2022-01-13 DIAGNOSIS — E78.00 PURE HYPERCHOLESTEROLEMIA: ICD-10-CM

## 2022-01-13 PROBLEM — K46.0 INCARCERATED HERNIA: Status: RESOLVED | Noted: 2021-12-08 | Resolved: 2022-01-13

## 2022-01-13 PROCEDURE — 99214 OFFICE O/P EST MOD 30 MIN: CPT | Performed by: FAMILY MEDICINE

## 2022-01-13 PROCEDURE — G8484 FLU IMMUNIZE NO ADMIN: HCPCS | Performed by: FAMILY MEDICINE

## 2022-01-13 PROCEDURE — 2022F DILAT RTA XM EVC RTNOPTHY: CPT | Performed by: FAMILY MEDICINE

## 2022-01-13 PROCEDURE — 1036F TOBACCO NON-USER: CPT | Performed by: FAMILY MEDICINE

## 2022-01-13 PROCEDURE — 3017F COLORECTAL CA SCREEN DOC REV: CPT | Performed by: FAMILY MEDICINE

## 2022-01-13 PROCEDURE — G8427 DOCREV CUR MEDS BY ELIG CLIN: HCPCS | Performed by: FAMILY MEDICINE

## 2022-01-13 PROCEDURE — 3046F HEMOGLOBIN A1C LEVEL >9.0%: CPT | Performed by: FAMILY MEDICINE

## 2022-01-13 PROCEDURE — 1123F ACP DISCUSS/DSCN MKR DOCD: CPT | Performed by: FAMILY MEDICINE

## 2022-01-13 PROCEDURE — 4040F PNEUMOC VAC/ADMIN/RCVD: CPT | Performed by: FAMILY MEDICINE

## 2022-01-13 PROCEDURE — G8417 CALC BMI ABV UP PARAM F/U: HCPCS | Performed by: FAMILY MEDICINE

## 2022-01-13 RX ORDER — PREDNISOLONE ACETATE 10 MG/ML
SUSPENSION/ DROPS OPHTHALMIC
COMMUNITY
Start: 2021-12-29 | End: 2022-07-27

## 2022-01-13 RX ORDER — OFLOXACIN 3 MG/ML
SOLUTION/ DROPS OPHTHALMIC
COMMUNITY
Start: 2021-12-21 | End: 2022-07-27

## 2022-01-13 ASSESSMENT — PATIENT HEALTH QUESTIONNAIRE - PHQ9
SUM OF ALL RESPONSES TO PHQ QUESTIONS 1-9: 0
2. FEELING DOWN, DEPRESSED OR HOPELESS: 0
1. LITTLE INTEREST OR PLEASURE IN DOING THINGS: 0
SUM OF ALL RESPONSES TO PHQ9 QUESTIONS 1 & 2: 0
SUM OF ALL RESPONSES TO PHQ QUESTIONS 1-9: 0

## 2022-01-13 ASSESSMENT — ENCOUNTER SYMPTOMS
SORE THROAT: 0
DIARRHEA: 0
CONSTIPATION: 0
SHORTNESS OF BREATH: 0
CHEST TIGHTNESS: 0
RHINORRHEA: 0
ABDOMINAL PAIN: 0

## 2022-01-13 NOTE — PATIENT INSTRUCTIONS
Dear Cata Moncada,     It was a pleasure seeing you today- as discussed please consider starting home blood pressure monitoring. I have attached instructions below. Home blood pressure monitoring generally results in lower blood pressure readings than in-office measurements, can confirm the diagnosis of hypertension after an elevated office blood pressure reading, and can identify patients with white coat hypertension or masked hypertension. Best practices for home blood pressure monitoring include:  - Using an appropriately fitting upper-arm cuff on a bare arm,-  - Emptying the bladder of urine  - Avoiding caffeinated beverages for 30 minutes before taking the measurement.  - Resting for five minutes before taking the measurement,  - Keeping the feet on the floor uncrossed and the arm supported with the cuff at heart level  - Not talking during the reading. Ideally two readings in the morning and again in the evening  by at least one minute each, is recommended for one week. Please send me your readings.     Your goal blood pressure is: less than 130/80

## 2022-01-13 NOTE — PROGRESS NOTES
Subjective:   Patient ID: Dexter Mckeon is a 73 y.o. male here today to establish care. HPI by clinical support staff:   Chief Complaint   Patient presents with   [facility], is here to establish care with you.  Hypertension    Medication Check      Preliminary data above this line collected by clinical support staff.    ______________________________________________________________________  HPI by Provider:   HPI   Patient presents to establish care. History reviewed and updated with patient today. Patient reports he was diagnosed with type 2 diabetes and hypertension about 15 years ago. She has never been on insulin last A1c was less than 7%. Cannot recall his diagnostic A1c. Has not been monitoring his blood pressures at home but states that all his doctors appointments have always been less than 120/80. Did have a dental procedure yesterday and was told his blood pressure was elevated. Denies any headaches, chest pain, vision changes or any other symptoms. Had his umbilical hernia repaired about a month ago recovering well. Due for an eye exam.  History of prostate cancer in 1997 status post prostatectomy in 1 month of radiation follows up with oncology. Data above this line collected by Provider. Patient's medications, allergies, past medical, surgical, social and family histories were reviewed and updated as appropriate.   Patient Care Team:  Mary Kay Parra MD as PCP - General (Family Medicine)    No Known Allergies  Current Outpatient Medications on File Prior to Visit   Medication Sig Dispense Refill    prednisoLONE acetate (PRED FORTE) 1 % ophthalmic suspension       ofloxacin (OCUFLOX) 0.3 % solution       metFORMIN (GLUCOPHAGE) 500 MG tablet Take 1 tablet by mouth daily (with breakfast) 90 tablet 3    lisinopril (PRINIVIL;ZESTRIL) 20 MG tablet Take 1 tablet by mouth daily 90 tablet 3    pravastatin (PRAVACHOL) 20 MG tablet Take 1 tablet by mouth daily 90 tablet 3    metroNIDAZOLE (METROGEL) 0.75 % gel Apply topically 2 times daily. 1 Tube 5    therapeutic multivitamin-minerals (THERAGRAN-M) tablet Take 1 tablet by mouth daily.  fish oil-omega-3 fatty acids 1000 MG capsule Take 2 g by mouth daily. No current facility-administered medications on file prior to visit. Review of Systems   Constitutional: Negative for activity change, appetite change, fatigue and fever. HENT: Negative for congestion, rhinorrhea and sore throat. Respiratory: Negative for chest tightness and shortness of breath. Cardiovascular: Negative for chest pain, palpitations and leg swelling. Gastrointestinal: Negative for abdominal pain, constipation and diarrhea. Genitourinary: Negative for dysuria and frequency. Musculoskeletal: Negative for arthralgias. Neurological: Negative for dizziness, weakness and headaches. Psychiatric/Behavioral: Negative for hallucinations. All other systems reviewed and are negative. ROS above this line reviewed by Provider. Objective:   /88 (Site: Left Upper Arm, Position: Sitting, Cuff Size: Medium Adult)   Pulse 75   Temp 98 °F (36.7 °C) (Temporal)   Wt 260 lb 2 oz (118 kg)   SpO2 98%   BMI 32.51 kg/m²   Physical Exam  Vitals and nursing note reviewed. Constitutional:       General: He is not in acute distress. Appearance: Normal appearance. He is normal weight. He is not ill-appearing, toxic-appearing or diaphoretic. HENT:      Head: Normocephalic and atraumatic. Eyes:      General: No scleral icterus. Conjunctiva/sclera: Conjunctivae normal.   Cardiovascular:      Rate and Rhythm: Normal rate and regular rhythm. Heart sounds: Normal heart sounds. No murmur heard. No friction rub. No gallop. Pulmonary:      Effort: Pulmonary effort is normal. No respiratory distress. Breath sounds: Normal breath sounds. No stridor. No wheezing, rhonchi or rales.    Musculoskeletal:      Cervical back: Normal range of motion. Skin:     General: Skin is warm and dry. Neurological:      Mental Status: He is alert. Psychiatric:         Mood and Affect: Mood normal.         Behavior: Behavior normal.       Lab Results   Component Value Date    WBC 6.9 12/12/2021    HGB 11.7 12/12/2021    HCT 33.8 12/12/2021    MCV 94.5 12/12/2021     12/12/2021     Lab Results   Component Value Date     12/12/2021    K 4.0 12/12/2021    K 4.6 12/09/2021    BUN 12 12/12/2021    CREATININE 0.6 12/12/2021    GLUCOSE 127 12/12/2021    GLUCOSE 136 04/03/2012    CALCIUM 7.6 12/12/2021    BILITOT 0.9 12/08/2021    ALKPHOS 71 12/08/2021    AST 21 12/08/2021    ALT 21 12/08/2021    GFRAA >60 12/12/2021    GFRAA 112 12/01/2011     Lab Results   Component Value Date    TSH 1.03 07/15/2021     Lab Results   Component Value Date    LABA1C 5.6 07/15/2021     No results found for: EAG  Lab Results   Component Value Date    CHOL 212 07/15/2021    TRIG 226 07/15/2021    HDL 63 07/15/2021    HDL 63 09/17/2010    CHOLHDLRATIO 3.4 07/15/2021     Lab Results   Component Value Date    LABMICR YES 12/08/2021     Lab Results   Component Value Date    VITD25 41 06/08/2016     Assessment and Plan:   1. Essential hypertension  Stable and not controlled. Discussed target BP goal per JNC 8 guidelines. He is currently managed on lisinopril (Prinivil). Patient was counseled on lifestyle modifications which included weight loss and exercising at least 3 times per week for an average of 40 minutes per session at moderate to vigorous intensity. Patient also counseled to follow a diet that emphasizes vegetables, fruits, and whole grains; includes low-fat dairy, poultry, fish, legumes, nuts, and non-tropical vegetable oils; and limits sweets, sugar-sweetened beverages, and red meats. Also advised to limit salt/ sodium intake to less than 2 gram per day. DASH diet hand out given to the patient.  Following risk factors were addressed: weight loss, and alcohol intake. Advised to continue monitoring BP at home and bring blood pressure machine and log to next appointment. Call or go to emergency department if symptoms recur/worsen. discussed emergent signs and symptoms that will need immediate attention.   - CBC Auto Differential; Future  - Comprehensive Metabolic Panel; Future    2. Controlled type 2 diabetes mellitus without complication, without long-term current use of insulin (HCC)  Last A1c in July 2021 5.6% patient has been well controlled for over a year on metformin. We will continue current regimen repeat A1c in 6 months.  - CBC Auto Differential; Future  - Comprehensive Metabolic Panel; Future  - Hemoglobin A1C; Future  - TSH WITH REFLEX TO FT4; Future  - Lipid Panel; Future  - PSA, Total and Free; Future    3. Pure hypercholesterolemia  Continue pravastatin stressed the importance of lifestyle modification. The 10-year ASCVD risk score (Shannan Acevedo, et al., 2013) is: 27.4%    Values used to calculate the score:      Age: 73 years      Sex: Male      Is Non- : No      Diabetic: Yes      Tobacco smoker: No      Systolic Blood Pressure: 052 mmHg      Is BP treated: Yes      HDL Cholesterol: 63 mg/dL      Total Cholesterol: 212 mg/dL  - Lipid Panel; Future    4. History of prostate cancer  Diagnosed in 1997 status post prostatectomy and 1 month of radiation currently seeing oncology for routine checkups. - PSA, Total and Free; Future         This chart note was prepared using a voice recognition dictation program. This note was reviewed for accuracy; however, addition, deletion and sound-alike word errors may occur. If there are any questions regarding this chart note, please contact the originating provider. Electronically signed by   Kevin Bruno MD  1/13/2022   1:26 PM    Return in about 6 months (around 7/13/2022) for AWV.

## 2022-07-20 LAB
ALBUMIN SERPL-MCNC: 2 G/DL
ALBUMIN SERPL-MCNC: NORMAL G/DL
ALP BLD-CCNC: 62 U/L
ALP BLD-CCNC: NORMAL U/L
ALT SERPL-CCNC: 29 U/L
ALT SERPL-CCNC: NORMAL U/L
ANION GAP SERPL CALCULATED.3IONS-SCNC: NORMAL MMOL/L
ANION GAP SERPL CALCULATED.3IONS-SCNC: NORMAL MMOL/L
AST SERPL-CCNC: 22 U/L
AST SERPL-CCNC: NORMAL U/L
AVERAGE GLUCOSE: ABNORMAL
BILIRUB SERPL-MCNC: 0.6 MG/DL (ref 0.1–1.4)
BILIRUB SERPL-MCNC: NORMAL MG/DL
BUN BLDV-MCNC: 13 MG/DL
BUN BLDV-MCNC: NORMAL MG/DL
CALCIUM SERPL-MCNC: 9.1 MG/DL
CALCIUM SERPL-MCNC: NORMAL MG/DL
CHLORIDE BLD-SCNC: 104 MMOL/L
CHLORIDE BLD-SCNC: NORMAL MMOL/L
CO2: 26 MMOL/L
CO2: NORMAL
CREAT SERPL-MCNC: 0.78 MG/DL
CREAT SERPL-MCNC: NORMAL MG/DL
GFR CALCULATED: 99
GFR CALCULATED: NORMAL
GLUCOSE BLD-MCNC: 145 MG/DL
GLUCOSE BLD-MCNC: NORMAL MG/DL
GLUCOSE BLD-MCNC: NORMAL MG/DL
HBA1C MFR BLD: 6 %
HCT, EXTERNAL: 42.7
HGB, EXTERNAL: 14.4
MCH, EXTERNAL: 32.4
MCHC, EXTERNAL: 33.7
MCV, EXTERNAL: 96
MPV, EXTERNAL: 9.9
NEUTROPHILS, EXTERNAL: 60
PLATELETS, EXTERNAL: 18.2
POTASSIUM SERPL-SCNC: 4.5 MMOL/L
POTASSIUM SERPL-SCNC: NORMAL MMOL/L
PROSTATE SPECIFIC ANTIGEN FREE: 0.1 NG/ML
PROSTATE SPECIFIC ANTIGEN PERCENT FREE: 5 %
PSA-PROSTATE SPECIFIC AG: 2.1
RBC, EXTERNAL: 4.45
RDW, EXTERNAL: 12.5
SODIUM BLD-SCNC: 139 MMOL/L
SODIUM BLD-SCNC: NORMAL MMOL/L
TOTAL PROTEIN: 6.7
TOTAL PROTEIN: NORMAL
WBC, EXTERNAL: 7.1

## 2022-07-25 ENCOUNTER — PATIENT MESSAGE (OUTPATIENT)
Dept: PRIMARY CARE CLINIC | Age: 67
End: 2022-07-25

## 2022-07-27 ENCOUNTER — OFFICE VISIT (OUTPATIENT)
Dept: PRIMARY CARE CLINIC | Age: 67
End: 2022-07-27
Payer: MEDICARE

## 2022-07-27 VITALS
OXYGEN SATURATION: 97 % | SYSTOLIC BLOOD PRESSURE: 146 MMHG | BODY MASS INDEX: 31.64 KG/M2 | RESPIRATION RATE: 18 BRPM | WEIGHT: 254.5 LBS | DIASTOLIC BLOOD PRESSURE: 77 MMHG | TEMPERATURE: 97.7 F | HEART RATE: 74 BPM | HEIGHT: 75 IN

## 2022-07-27 DIAGNOSIS — Z00.00 WELCOME TO MEDICARE PREVENTIVE VISIT: Primary | ICD-10-CM

## 2022-07-27 PROCEDURE — G0402 INITIAL PREVENTIVE EXAM: HCPCS | Performed by: FAMILY MEDICINE

## 2022-07-27 PROCEDURE — 1123F ACP DISCUSS/DSCN MKR DOCD: CPT | Performed by: FAMILY MEDICINE

## 2022-07-27 RX ORDER — LISINOPRIL 20 MG/1
20 TABLET ORAL DAILY
Qty: 90 TABLET | Refills: 3 | Status: SHIPPED | OUTPATIENT
Start: 2022-07-27

## 2022-07-27 RX ORDER — PRAVASTATIN SODIUM 20 MG
20 TABLET ORAL DAILY
Qty: 90 TABLET | Refills: 3 | Status: SHIPPED | OUTPATIENT
Start: 2022-07-27

## 2022-07-27 SDOH — ECONOMIC STABILITY: FOOD INSECURITY: WITHIN THE PAST 12 MONTHS, YOU WORRIED THAT YOUR FOOD WOULD RUN OUT BEFORE YOU GOT MONEY TO BUY MORE.: NEVER TRUE

## 2022-07-27 SDOH — ECONOMIC STABILITY: FOOD INSECURITY: WITHIN THE PAST 12 MONTHS, THE FOOD YOU BOUGHT JUST DIDN'T LAST AND YOU DIDN'T HAVE MONEY TO GET MORE.: NEVER TRUE

## 2022-07-27 ASSESSMENT — LIFESTYLE VARIABLES
HOW OFTEN DO YOU HAVE A DRINK CONTAINING ALCOHOL: 4 OR MORE TIMES A WEEK
HOW MANY STANDARD DRINKS CONTAINING ALCOHOL DO YOU HAVE ON A TYPICAL DAY: 1 OR 2
HOW OFTEN DURING THE LAST YEAR HAVE YOU BEEN UNABLE TO REMEMBER WHAT HAPPENED THE NIGHT BEFORE BECAUSE YOU HAD BEEN DRINKING: 0
HOW OFTEN DURING THE LAST YEAR HAVE YOU FOUND THAT YOU WERE NOT ABLE TO STOP DRINKING ONCE YOU HAD STARTED: 0
HAVE YOU OR SOMEONE ELSE BEEN INJURED AS A RESULT OF YOUR DRINKING: 0
HOW OFTEN DURING THE LAST YEAR HAVE YOU HAD A FEELING OF GUILT OR REMORSE AFTER DRINKING: 0
HAS A RELATIVE, FRIEND, DOCTOR, OR ANOTHER HEALTH PROFESSIONAL EXPRESSED CONCERN ABOUT YOUR DRINKING OR SUGGESTED YOU CUT DOWN: 0
HOW OFTEN DURING THE LAST YEAR HAVE YOU FAILED TO DO WHAT WAS NORMALLY EXPECTED FROM YOU BECAUSE OF DRINKING: 0
HOW OFTEN DURING THE LAST YEAR HAVE YOU NEEDED AN ALCOHOLIC DRINK FIRST THING IN THE MORNING TO GET YOURSELF GOING AFTER A NIGHT OF HEAVY DRINKING: 0

## 2022-07-27 ASSESSMENT — PATIENT HEALTH QUESTIONNAIRE - PHQ9
SUM OF ALL RESPONSES TO PHQ9 QUESTIONS 1 & 2: 0
1. LITTLE INTEREST OR PLEASURE IN DOING THINGS: 0
2. FEELING DOWN, DEPRESSED OR HOPELESS: 0
SUM OF ALL RESPONSES TO PHQ QUESTIONS 1-9: 0

## 2022-07-27 ASSESSMENT — SOCIAL DETERMINANTS OF HEALTH (SDOH): HOW HARD IS IT FOR YOU TO PAY FOR THE VERY BASICS LIKE FOOD, HOUSING, MEDICAL CARE, AND HEATING?: NOT HARD AT ALL

## 2022-07-27 NOTE — PROGRESS NOTES
Medicare Annual Wellness Visit    [facility] Br[facility]e is here for Medicare AWV    Assessment & Plan   Welcome to Medicare preventive visit    Recommendations for Preventive Services Due: see orders and patient instructions/AVS.  Recommended screening schedule for the next 5-10 years is provided to the patient in written form: see Patient Instructions/AVS.     Return in 6 months (on 1/27/2023) for Medicare Annual Wellness Visit in 1 year. Subjective   Chief Complaint   Patient presents with    Medicare AWV   No other concerns today routine follow-up. Patient's complete Health Risk Assessment and screening values have been reviewed and are found in Flowsheets. The following problems were reviewed today and where indicated follow up appointments were made and/or referrals ordered.     Positive Risk Factor Screenings with Interventions:              Health Habits/Nutrition:  Physical Activity: Sufficiently Active    Days of Exercise per Week: 7 days    Minutes of Exercise per Session: 60 min     Have you lost any weight without trying in the past 3 months?: No  Body mass index: (!) 31.81  Have you seen the dentist within the past year?: Yes  Health Habits/Nutrition Interventions:  Inadequate physical activity:  patient agrees to exercise for at least 150 minutes/week     Safety:  Do you have working smoke detectors?: Yes  Do you have any tripping hazards - loose or unsecured carpets or rugs?: No  Do you have any tripping hazards - clutter in doorways, halls, or stairs?: No  Do you have either shower bars, grab bars, non-slip mats or non-slip surfaces in your shower or bathtub?: (!) No  Do all of your stairways have a railing or banister?: Yes  Do you always fasten your seatbelt when you are in a car?: Yes  Safety Interventions:  Home safety tips provided           Objective   Vitals:    07/27/22 1110 07/27/22 1116 07/27/22 1153 07/27/22 1154   BP: (!) 158/85 (!) 145/82 (!) 145/82 (!) 146/77   Site: Right Upper Arm Left Upper Arm Right Upper Arm Left Upper Arm   Position: Sitting Sitting Sitting Sitting   Cuff Size: Large Adult Large Adult Large Adult Large Adult   Pulse: 74      Resp: 18      Temp: 97.7 °F (36.5 °C)      TempSrc: Temporal      SpO2: 97%      Weight: 254 lb 8 oz (115.4 kg)      Height: 6' 3\" (1.905 m)         Body mass index is 31.81 kg/m². General Appearance: alert and oriented to person, place and time, well developed and well- nourished, in no acute distress  Skin: warm and dry, no rash or erythema  Head: normocephalic and atraumatic  Eyes: pupils equal, round, and reactive to light, extraocular eye movements intact, conjunctivae normal  ENT: tympanic membrane, external ear and ear canal normal bilaterally, nose without deformity, nasal mucosa and turbinates normal without polyps  Neck: supple and non-tender without mass, no thyromegaly or thyroid nodules, no cervical lymphadenopathy  Pulmonary/Chest: clear to auscultation bilaterally- no wheezes, rales or rhonchi, normal air movement, no respiratory distress  Cardiovascular: normal rate, regular rhythm, normal S1 and S2, no murmurs, rubs, clicks, or gallops, distal pulses intact, no carotid bruits  Abdomen: soft, non-tender, non-distended, normal bowel sounds, no masses or organomegaly  Extremities: no cyanosis, clubbing or edema  Musculoskeletal: normal range of motion, no joint swelling, deformity or tenderness  Neurologic: reflexes normal and symmetric, no cranial nerve deficit, gait, coordination and speech normal       No Known Allergies  Prior to Visit Medications    Medication Sig Taking? Authorizing Provider   pravastatin (PRAVACHOL) 20 MG tablet Take 1 tablet by mouth in the morning. Yes Cintia Moraes MD   metFORMIN (GLUCOPHAGE) 500 MG tablet Take 1 tablet by mouth daily (with breakfast) Yes Cintia Moraes MD   lisinopril (PRINIVIL;ZESTRIL) 20 MG tablet Take 1 tablet by mouth in the morning.  Yes Cintia Moraes MD   therapeutic

## 2022-07-27 NOTE — PATIENT INSTRUCTIONS
Personalized Preventive Plan for Beulah Glover - 7/27/2022  Medicare offers a range of preventive health benefits. Some of the tests and screenings are paid in full while other may be subject to a deductible, co-insurance, and/or copay. Some of these benefits include a comprehensive review of your medical history including lifestyle, illnesses that may run in your family, and various assessments and screenings as appropriate. After reviewing your medical record and screening and assessments performed today your provider may have ordered immunizations, labs, imaging, and/or referrals for you. A list of these orders (if applicable) as well as your Preventive Care list are included within your After Visit Summary for your review. Other Preventive Recommendations:    A preventive eye exam performed by an eye specialist is recommended every 1-2 years to screen for glaucoma; cataracts, macular degeneration, and other eye disorders. A preventive dental visit is recommended every 6 months. Try to get at least 150 minutes of exercise per week or 10,000 steps per day on a pedometer . Order or download the FREE \"Exercise & Physical Activity: Your Everyday Guide\" from [employer]. Call 6-952.213.8772 or search [employer] online. You need 4118-6550 mg of calcium and 7178-1357 IU of vitamin D per day. It is possible to meet your calcium requirement with diet alone, but a vitamin D supplement is usually necessary to meet this goal.  When exposed to the sun, use a sunscreen that protects against both UVA and UVB radiation with an SPF of 30 or greater. Reapply every 2 to 3 hours or after sweating, drying off with a towel, or swimming. Always wear a seat belt when traveling in a car. Always wear a helmet when riding a bicycle or motorcycle.

## 2022-08-07 NOTE — TELEPHONE ENCOUNTER
Please see Patient message- labs entered were erroneous- please correct and double check all values.    thank you

## 2022-08-07 NOTE — TELEPHONE ENCOUNTER
From: Gilbert Gomes  Sent: 8/5/2022 11:28 AM EDT  To: [phone] [id number] [facility] Clinical Staff  Subject: Labs    Not sure why labs are finally showing up; however, PSA was 2.1 ng/ml not 5. The 5 was the PSA% free which I believe is irrelevant when the PSA is below 2.6 ng/ml. [facility] and Dr Holly Leija is tracking PSA which was 2.08 in 6/22 which he was pleased about. I still have paper copy of blood test. Thank you.

## 2022-08-08 ENCOUNTER — TELEPHONE (OUTPATIENT)
Dept: PRIMARY CARE CLINIC | Age: 67
End: 2022-08-08

## 2022-08-09 ENCOUNTER — TELEPHONE (OUTPATIENT)
Dept: PRIMARY CARE CLINIC | Age: 67
End: 2022-08-09

## 2022-08-09 NOTE — TELEPHONE ENCOUNTER
Pt called regarding his test results being entered in wrong.  Pt concerned and just checking prudence status

## 2023-08-07 ENCOUNTER — OFFICE VISIT (OUTPATIENT)
Dept: PRIMARY CARE CLINIC | Age: 68
End: 2023-08-07

## 2023-08-07 VITALS
HEART RATE: 70 BPM | TEMPERATURE: 100.2 F | RESPIRATION RATE: 16 BRPM | HEIGHT: 75 IN | BODY MASS INDEX: 30.51 KG/M2 | SYSTOLIC BLOOD PRESSURE: 138 MMHG | OXYGEN SATURATION: 98 % | WEIGHT: 245.4 LBS | DIASTOLIC BLOOD PRESSURE: 86 MMHG

## 2023-08-07 DIAGNOSIS — E78.00 PURE HYPERCHOLESTEROLEMIA: Chronic | ICD-10-CM

## 2023-08-07 DIAGNOSIS — Z00.00 MEDICARE ANNUAL WELLNESS VISIT, SUBSEQUENT: Primary | ICD-10-CM

## 2023-08-07 DIAGNOSIS — I10 ESSENTIAL HYPERTENSION: Chronic | ICD-10-CM

## 2023-08-07 DIAGNOSIS — E11.9 CONTROLLED TYPE 2 DIABETES MELLITUS WITHOUT COMPLICATION, WITHOUT LONG-TERM CURRENT USE OF INSULIN (HCC): ICD-10-CM

## 2023-08-07 RX ORDER — LISINOPRIL 20 MG/1
20 TABLET ORAL DAILY
Qty: 90 TABLET | Refills: 3 | Status: SHIPPED | OUTPATIENT
Start: 2023-08-07

## 2023-08-07 RX ORDER — PRAVASTATIN SODIUM 20 MG
20 TABLET ORAL DAILY
Qty: 90 TABLET | Refills: 3 | Status: SHIPPED | OUTPATIENT
Start: 2023-08-07

## 2023-08-07 SDOH — ECONOMIC STABILITY: FOOD INSECURITY: WITHIN THE PAST 12 MONTHS, YOU WORRIED THAT YOUR FOOD WOULD RUN OUT BEFORE YOU GOT MONEY TO BUY MORE.: NEVER TRUE

## 2023-08-07 SDOH — ECONOMIC STABILITY: INCOME INSECURITY: HOW HARD IS IT FOR YOU TO PAY FOR THE VERY BASICS LIKE FOOD, HOUSING, MEDICAL CARE, AND HEATING?: NOT HARD AT ALL

## 2023-08-07 SDOH — ECONOMIC STABILITY: FOOD INSECURITY: WITHIN THE PAST 12 MONTHS, THE FOOD YOU BOUGHT JUST DIDN'T LAST AND YOU DIDN'T HAVE MONEY TO GET MORE.: NEVER TRUE

## 2023-08-07 SDOH — ECONOMIC STABILITY: HOUSING INSECURITY
IN THE LAST 12 MONTHS, WAS THERE A TIME WHEN YOU DID NOT HAVE A STEADY PLACE TO SLEEP OR SLEPT IN A SHELTER (INCLUDING NOW)?: NO

## 2023-08-07 ASSESSMENT — LIFESTYLE VARIABLES
HOW OFTEN DO YOU HAVE A DRINK CONTAINING ALCOHOL: 4 OR MORE TIMES A WEEK
HAVE YOU OR SOMEONE ELSE BEEN INJURED AS A RESULT OF YOUR DRINKING: 0
HAS A RELATIVE, FRIEND, DOCTOR, OR ANOTHER HEALTH PROFESSIONAL EXPRESSED CONCERN ABOUT YOUR DRINKING OR SUGGESTED YOU CUT DOWN: 0
HOW OFTEN DURING THE LAST YEAR HAVE YOU BEEN UNABLE TO REMEMBER WHAT HAPPENED THE NIGHT BEFORE BECAUSE YOU HAD BEEN DRINKING: 0
HOW OFTEN DURING THE LAST YEAR HAVE YOU HAD A FEELING OF GUILT OR REMORSE AFTER DRINKING: 0
HOW MANY STANDARD DRINKS CONTAINING ALCOHOL DO YOU HAVE ON A TYPICAL DAY: 1 OR 2
HOW OFTEN DURING THE LAST YEAR HAVE YOU NEEDED AN ALCOHOLIC DRINK FIRST THING IN THE MORNING TO GET YOURSELF GOING AFTER A NIGHT OF HEAVY DRINKING: 0
HOW OFTEN DURING THE LAST YEAR HAVE YOU FAILED TO DO WHAT WAS NORMALLY EXPECTED FROM YOU BECAUSE OF DRINKING: 0
HOW OFTEN DURING THE LAST YEAR HAVE YOU FOUND THAT YOU WERE NOT ABLE TO STOP DRINKING ONCE YOU HAD STARTED: 0

## 2023-08-07 NOTE — PROGRESS NOTES
Medicare Annual Wellness Visit    Galilea Ramos is here for Medicare AWV    Assessment & Plan   Medicare annual wellness visit, subsequent  -     CBC with Auto Differential; Future  -     Comprehensive Metabolic Panel; Future  -     Hemoglobin A1C; Future  -     TSH with Reflex to FT4; Future  -     Lipid Panel; Future  Controlled type 2 diabetes mellitus without complication, without long-term current use of insulin (HCC)  -     MICROALBUMIN / CREATININE URINE RATIO; Future  -     CBC with Auto Differential; Future  -     Comprehensive Metabolic Panel; Future  -     Hemoglobin A1C; Future  -     TSH with Reflex to FT4; Future  -     Lipid Panel; Future  Essential hypertension  -     CBC with Auto Differential; Future  -     Comprehensive Metabolic Panel; Future  -     Hemoglobin A1C; Future  -     TSH with Reflex to FT4; Future  -     Lipid Panel; Future  Pure hypercholesterolemia  -     CBC with Auto Differential; Future  -     Comprehensive Metabolic Panel; Future  -     Hemoglobin A1C; Future  -     TSH with Reflex to FT4; Future  -     Lipid Panel; Future    Recommendations for Preventive Services Due: see orders and patient instructions/AVS.  Recommended screening schedule for the next 5-10 years is provided to the patient in written form: see Patient Instructions/AVS.     No follow-ups on file. Subjective   No new concerns- home blood pressure readings within goal. No headache , chest pain  or shortness of breath. States medication  compliance. Patient's complete Health Risk Assessment and screening values have been reviewed and are found in Flowsheets. The following problems were reviewed today and where indicated follow up appointments were made and/or referrals ordered.     Positive Risk Factor Screenings with Interventions:                 Weight and Activity:  Physical Activity: Sufficiently Active    Days of Exercise per Week: 7 days    Minutes of Exercise per Session: 60 min     On average,

## 2023-08-07 NOTE — PATIENT INSTRUCTIONS

## 2023-08-14 LAB
ALBUMIN SERPL-MCNC: 4.6 G/DL
ALP BLD-CCNC: 56 U/L
ALT SERPL-CCNC: 24 U/L
ANION GAP SERPL CALCULATED.3IONS-SCNC: NORMAL MMOL/L
AST SERPL-CCNC: 18 U/L
BASOPHILS ABSOLUTE: 59 /ΜL
BASOPHILS RELATIVE PERCENT: 0.8 %
BILIRUB SERPL-MCNC: 0.8 MG/DL (ref 0.1–1.4)
BUN BLDV-MCNC: 15 MG/DL
CALCIUM SERPL-MCNC: 9.1 MG/DL
CHLORIDE BLD-SCNC: 103 MMOL/L
CHOLESTEROL, TOTAL: 200 MG/DL
CHOLESTEROL/HDL RATIO: 3.4
CO2: 28 MMOL/L
CREAT SERPL-MCNC: 0.85 MG/DL
CREATININE, URINE: 124
EGFR: 96
EOSINOPHILS ABSOLUTE: 451 /ΜL
EOSINOPHILS RELATIVE PERCENT: 6.1 %
GLUCOSE BLD-MCNC: 130 MG/DL
HCT VFR BLD CALC: 42.2 % (ref 41–53)
HDLC SERPL-MCNC: 59 MG/DL (ref 35–70)
HEMOGLOBIN: 14.5 G/DL (ref 13.5–17.5)
LDL CHOLESTEROL CALCULATED: 117 MG/DL (ref 0–160)
LYMPHOCYTES ABSOLUTE: 2287 /ΜL
LYMPHOCYTES RELATIVE PERCENT: 30.9 %
MCH RBC QN AUTO: 33.3 PG
MCHC RBC AUTO-ENTMCNC: 34.4 G/DL
MCV RBC AUTO: 97 FL
MICROALBUMIN/CREAT 24H UR: 0.5 MG/G{CREAT}
MICROALBUMIN/CREAT UR-RTO: 4
MONOCYTES ABSOLUTE: 533 /ΜL
MONOCYTES RELATIVE PERCENT: 7.2 %
NEUTROPHILS ABSOLUTE: 4070 /ΜL
NEUTROPHILS RELATIVE PERCENT: 55 %
NONHDLC SERPL-MCNC: 141 MG/DL
PLATELET # BLD: 197 K/ΜL
PMV BLD AUTO: 10.5 FL
POTASSIUM SERPL-SCNC: 4.4 MMOL/L
RBC # BLD: 4.35 10^6/ΜL
SODIUM BLD-SCNC: 141 MMOL/L
TOTAL PROTEIN: 6.9
TRIGL SERPL-MCNC: 126 MG/DL
VLDLC SERPL CALC-MCNC: NORMAL MG/DL
WBC # BLD: 7.4 10^3/ML

## 2023-08-21 LAB
AVERAGE GLUCOSE: NORMAL
HBA1C MFR BLD: 5.8 %

## 2024-07-11 LAB — PROSTATE SPECIFIC ANTIGEN: 2.05 NG/ML

## 2024-07-29 RX ORDER — LISINOPRIL 20 MG/1
20 TABLET ORAL DAILY
Qty: 90 TABLET | Refills: 3 | Status: SHIPPED | OUTPATIENT
Start: 2024-07-29

## 2024-07-29 RX ORDER — PRAVASTATIN SODIUM 20 MG
20 TABLET ORAL DAILY
Qty: 90 TABLET | Refills: 3 | Status: SHIPPED | OUTPATIENT
Start: 2024-07-29

## 2024-07-29 NOTE — TELEPHONE ENCOUNTER
Medication:   Requested Prescriptions     Pending Prescriptions Disp Refills    lisinopril (PRINIVIL;ZESTRIL) 20 MG tablet [Pharmacy Med Name: LISINOPRIL 20 MG TABLET] 90 tablet 3     Sig: TAKE 1 TABLET BY MOUTH DAILY    metFORMIN (GLUCOPHAGE) 500 MG tablet [Pharmacy Med Name: METFORMIN  MG TABLET] 90 tablet 3     Sig: TAKE 1 TABLET BY MOUTH DAILY WITH BREAKFAST    pravastatin (PRAVACHOL) 20 MG tablet [Pharmacy Med Name: PRAVASTATIN SODIUM 20 MG TAB] 90 tablet 3     Sig: TAKE 1 TABLET BY MOUTH DAILY        Last Filled:  lisinopril (PRINIVIL;ZESTRIL) 20 MG tablet  8/17/2023  metFORMIN (GLUCOPHAGE) 500 MG tablet 8/7/2023  pravastatin (PRAVACHOL) 20 MG tablet  8/7/2023    Patient Phone Number: [phone] (home)     Last appt: 8/7/2023   Next appt: 9/10/2024    Last OARRS:        No data to display

## 2024-09-10 ENCOUNTER — OFFICE VISIT (OUTPATIENT)
Dept: PRIMARY CARE CLINIC | Age: 69
End: 2024-09-10

## 2024-09-10 VITALS
DIASTOLIC BLOOD PRESSURE: 76 MMHG | HEART RATE: 72 BPM | RESPIRATION RATE: 16 BRPM | HEIGHT: 75 IN | OXYGEN SATURATION: 98 % | BODY MASS INDEX: 31.12 KG/M2 | WEIGHT: 250.3 LBS | SYSTOLIC BLOOD PRESSURE: 149 MMHG | TEMPERATURE: 98.9 F

## 2024-09-10 DIAGNOSIS — I10 ESSENTIAL HYPERTENSION: ICD-10-CM

## 2024-09-10 DIAGNOSIS — Z00.00 MEDICARE ANNUAL WELLNESS VISIT, SUBSEQUENT: Primary | ICD-10-CM

## 2024-09-10 DIAGNOSIS — E11.9 CONTROLLED TYPE 2 DIABETES MELLITUS WITHOUT COMPLICATION, WITHOUT LONG-TERM CURRENT USE OF INSULIN (HCC): Chronic | ICD-10-CM

## 2024-09-10 DIAGNOSIS — E78.00 PURE HYPERCHOLESTEROLEMIA: ICD-10-CM

## 2024-09-10 LAB — HBA1C MFR BLD: 6.3 %

## 2024-09-10 SDOH — ECONOMIC STABILITY: FOOD INSECURITY: WITHIN THE PAST 12 MONTHS, YOU WORRIED THAT YOUR FOOD WOULD RUN OUT BEFORE YOU GOT MONEY TO BUY MORE.: NEVER TRUE

## 2024-09-10 SDOH — ECONOMIC STABILITY: INCOME INSECURITY: HOW HARD IS IT FOR YOU TO PAY FOR THE VERY BASICS LIKE FOOD, HOUSING, MEDICAL CARE, AND HEATING?: NOT HARD AT ALL

## 2024-09-10 SDOH — ECONOMIC STABILITY: FOOD INSECURITY: WITHIN THE PAST 12 MONTHS, THE FOOD YOU BOUGHT JUST DIDN'T LAST AND YOU DIDN'T HAVE MONEY TO GET MORE.: NEVER TRUE

## 2024-09-10 ASSESSMENT — PATIENT HEALTH QUESTIONNAIRE - PHQ9
SUM OF ALL RESPONSES TO PHQ9 QUESTIONS 1 & 2: 0
SUM OF ALL RESPONSES TO PHQ QUESTIONS 1-9: 0
SUM OF ALL RESPONSES TO PHQ QUESTIONS 1-9: 0
1. LITTLE INTEREST OR PLEASURE IN DOING THINGS: NOT AT ALL
SUM OF ALL RESPONSES TO PHQ QUESTIONS 1-9: 0
SUM OF ALL RESPONSES TO PHQ QUESTIONS 1-9: 0
2. FEELING DOWN, DEPRESSED OR HOPELESS: NOT AT ALL

## 2024-09-10 ASSESSMENT — LIFESTYLE VARIABLES
HOW OFTEN DURING THE LAST YEAR HAVE YOU FOUND THAT YOU WERE NOT ABLE TO STOP DRINKING ONCE YOU HAD STARTED: NEVER
HOW MANY STANDARD DRINKS CONTAINING ALCOHOL DO YOU HAVE ON A TYPICAL DAY: 1 OR 2
HAS A RELATIVE, FRIEND, DOCTOR, OR ANOTHER HEALTH PROFESSIONAL EXPRESSED CONCERN ABOUT YOUR DRINKING OR SUGGESTED YOU CUT DOWN: NO
HOW OFTEN DURING THE LAST YEAR HAVE YOU HAD A FEELING OF GUILT OR REMORSE AFTER DRINKING: NEVER
HOW OFTEN DO YOU HAVE A DRINK CONTAINING ALCOHOL: 4 OR MORE TIMES A WEEK
HOW OFTEN DURING THE LAST YEAR HAVE YOU FAILED TO DO WHAT WAS NORMALLY EXPECTED FROM YOU BECAUSE OF DRINKING: NEVER
HOW OFTEN DURING THE LAST YEAR HAVE YOU NEEDED AN ALCOHOLIC DRINK FIRST THING IN THE MORNING TO GET YOURSELF GOING AFTER A NIGHT OF HEAVY DRINKING: NEVER
HOW OFTEN DURING THE LAST YEAR HAVE YOU BEEN UNABLE TO REMEMBER WHAT HAPPENED THE NIGHT BEFORE BECAUSE YOU HAD BEEN DRINKING: NEVER
HAVE YOU OR SOMEONE ELSE BEEN INJURED AS A RESULT OF YOUR DRINKING: NO

## 2024-09-19 ENCOUNTER — HOSPITAL ENCOUNTER (OUTPATIENT)
Dept: CT IMAGING | Age: 69
Discharge: HOME | End: 2024-09-19
Payer: MEDICARE

## 2024-09-19 DIAGNOSIS — E11.9 CONTROLLED TYPE 2 DIABETES MELLITUS WITHOUT COMPLICATION, WITHOUT LONG-TERM CURRENT USE OF INSULIN (HCC): Chronic | ICD-10-CM

## 2024-09-19 DIAGNOSIS — E78.00 PURE HYPERCHOLESTEROLEMIA: ICD-10-CM

## 2024-09-19 DIAGNOSIS — Z00.00 MEDICARE ANNUAL WELLNESS VISIT, SUBSEQUENT: ICD-10-CM

## 2024-09-19 DIAGNOSIS — I10 ESSENTIAL HYPERTENSION: ICD-10-CM

## 2024-09-19 PROCEDURE — 75571 CT HRT W/O DYE W/CA TEST: CPT

## 2024-09-20 ENCOUNTER — PATIENT MESSAGE (OUTPATIENT)
Dept: PRIMARY CARE CLINIC | Age: 69
End: 2024-09-20

## 2024-09-28 ENCOUNTER — PATIENT MESSAGE (OUTPATIENT)
Dept: PRIMARY CARE CLINIC | Age: 69
End: 2024-09-28

## 2025-02-25 ENCOUNTER — OFFICE VISIT (OUTPATIENT)
Dept: PRIMARY CARE CLINIC | Age: 70
End: 2025-02-25

## 2025-02-25 DIAGNOSIS — K76.9 LIVER LESION: ICD-10-CM

## 2025-02-25 DIAGNOSIS — S46.811A STRAIN OF RIGHT TRAPEZIUS MUSCLE, INITIAL ENCOUNTER: Primary | ICD-10-CM

## 2025-02-25 DIAGNOSIS — R00.0 TACHYCARDIA: ICD-10-CM

## 2025-02-25 DIAGNOSIS — R50.9 FEVER IN ADULT: ICD-10-CM

## 2025-02-25 DIAGNOSIS — I10 ESSENTIAL HYPERTENSION: ICD-10-CM

## 2025-02-25 LAB
BILIRUBIN, POC: NORMAL
BLOOD URINE, POC: NORMAL
CLARITY, POC: NORMAL
COLOR, POC: NORMAL
EXP DATE SOLUTION: NORMAL
EXP DATE SWAB: NORMAL
EXPIRATION DATE: NORMAL
GLUCOSE URINE, POC: NORMAL MG/DL
INFLUENZA A RNA, POC: NORMAL
INFLUENZA B RNA, POC: NORMAL
KETONES, POC: NORMAL MG/DL
LEUKOCYTE EST, POC: NORMAL
LOT NUMBER POC: NORMAL
LOT NUMBER SOLUTION: NORMAL
LOT NUMBER SWAB: NORMAL
NITRITE, POC: NORMAL
PH, POC: 6.5
PROTEIN, POC: 30 MG/DL
S PYO AG THROAT QL: NORMAL
SARS-COV-2 RNA, POC: NEGATIVE
SPECIFIC GRAVITY, POC: NORMAL
UROBILINOGEN, POC: 4 MG/DL
VALID INTERNAL CONTROL: NORMAL

## 2025-02-25 RX ORDER — LISINOPRIL 40 MG/1
40 TABLET ORAL DAILY
Qty: 30 TABLET | Refills: 0 | Status: SHIPPED | OUTPATIENT
Start: 2025-02-25 | End: 2025-02-26

## 2025-02-25 RX ORDER — IBUPROFEN 600 MG/1
600 TABLET, FILM COATED ORAL EVERY 8 HOURS PRN
Qty: 30 TABLET | Refills: 0 | Status: SHIPPED | OUTPATIENT
Start: 2025-02-25

## 2025-02-25 RX ORDER — KETOROLAC TROMETHAMINE 30 MG/ML
60 INJECTION, SOLUTION INTRAMUSCULAR; INTRAVENOUS ONCE
Status: COMPLETED | OUTPATIENT
Start: 2025-02-25 | End: 2025-02-25

## 2025-02-25 RX ORDER — CYCLOBENZAPRINE HCL 10 MG
10 TABLET ORAL NIGHTLY
Qty: 20 TABLET | Refills: 0 | Status: SHIPPED | OUTPATIENT
Start: 2025-02-25

## 2025-02-25 RX ORDER — CYCLOBENZAPRINE HCL 10 MG
10 TABLET ORAL 3 TIMES DAILY PRN
Qty: 21 TABLET | Refills: 0 | Status: SHIPPED | OUTPATIENT
Start: 2025-02-25 | End: 2025-02-25 | Stop reason: SDUPTHER

## 2025-02-25 RX ADMIN — KETOROLAC TROMETHAMINE 60 MG: 30 INJECTION, SOLUTION INTRAMUSCULAR; INTRAVENOUS at 14:01

## 2025-02-25 SDOH — ECONOMIC STABILITY: FOOD INSECURITY: WITHIN THE PAST 12 MONTHS, YOU WORRIED THAT YOUR FOOD WOULD RUN OUT BEFORE YOU GOT MONEY TO BUY MORE.: NEVER TRUE

## 2025-02-25 SDOH — ECONOMIC STABILITY: FOOD INSECURITY: WITHIN THE PAST 12 MONTHS, THE FOOD YOU BOUGHT JUST DIDN'T LAST AND YOU DIDN'T HAVE MONEY TO GET MORE.: NEVER TRUE

## 2025-02-25 ASSESSMENT — PATIENT HEALTH QUESTIONNAIRE - PHQ9
2. FEELING DOWN, DEPRESSED OR HOPELESS: NOT AT ALL
SUM OF ALL RESPONSES TO PHQ QUESTIONS 1-9: 0
SUM OF ALL RESPONSES TO PHQ9 QUESTIONS 1 & 2: 0
SUM OF ALL RESPONSES TO PHQ QUESTIONS 1-9: 0
SUM OF ALL RESPONSES TO PHQ QUESTIONS 1-9: 0
1. LITTLE INTEREST OR PLEASURE IN DOING THINGS: NOT AT ALL
SUM OF ALL RESPONSES TO PHQ QUESTIONS 1-9: 0

## 2025-02-25 NOTE — PROGRESS NOTES
Subjective:   Patient ID: Duarte Dc is a 69 y.o. male.  HPI by clinical support staff:   Chief Complaint   Patient presents with    Back Pain     Present for back pain that started on his left shoulder on Saturday moved to the right shoulder and neck. Was feeling little better on Monday but this morning it unbearable. States he waited for 4 days hoping it would go away but it didn't.        Preliminary data above this line collected by clinical support staff.    ______________________________________________________________________  HPI by Provider:   HPI   Patient presents today with complaint of upper back pain that has been ongoing since yesterday.  States that he slept in the wrong position but has been very stressed lately.  Has not tried anything for his symptoms so far.  Denies any numbness or tingling.  Patient had a fever on arrival but was not aware.  States that his heart rate has been elevated.  Has not eaten much today only did have a piece of bread to take his medications.  Denies any presyncopal episodes or dizziness.  States that he has noticed a sore throat since this morning but overall feels unwell.  Patient states he is worried he may have cancer.  States that he worries a lot and looks online for every symptom that he has to make sure that he is well.   Data above this line collected by Provider.    Patient's medications, allergies, past medical, surgical, social and family histories were reviewed and updated as appropriate.  Patient Care Team:  Charley Piper MD as PCP - General (Family Medicine)  Charley Piper MD as PCP - Empaneled Provider  Current Outpatient Medications on File Prior to Visit   Medication Sig Dispense Refill    metFORMIN (GLUCOPHAGE) 500 MG tablet TAKE 1 TABLET BY MOUTH DAILY WITH BREAKFAST 90 tablet 3    pravastatin (PRAVACHOL) 20 MG tablet TAKE 1 TABLET BY MOUTH DAILY 90 tablet 3    therapeutic multivitamin-minerals (THERAGRAN-M) tablet Take 1 tablet by

## 2025-02-26 ENCOUNTER — TELEPHONE (OUTPATIENT)
Dept: PRIMARY CARE CLINIC | Age: 70
End: 2025-02-26

## 2025-02-26 ENCOUNTER — HOSPITAL ENCOUNTER (EMERGENCY)
Age: 70
Discharge: HOME | End: 2025-02-26
Attending: STUDENT IN AN ORGANIZED HEALTH CARE EDUCATION/TRAINING PROGRAM
Payer: MEDICARE

## 2025-02-26 VITALS
DIASTOLIC BLOOD PRESSURE: 110 MMHG | HEIGHT: 75 IN | BODY MASS INDEX: 32.07 KG/M2 | WEIGHT: 257.9 LBS | OXYGEN SATURATION: 98 % | TEMPERATURE: 100.6 F | SYSTOLIC BLOOD PRESSURE: 157 MMHG | HEART RATE: 101 BPM

## 2025-02-26 VITALS
OXYGEN SATURATION: 99 % | RESPIRATION RATE: 20 BRPM | HEART RATE: 89 BPM | WEIGHT: 253.31 LBS | BODY MASS INDEX: 31.5 KG/M2 | DIASTOLIC BLOOD PRESSURE: 82 MMHG | HEIGHT: 75 IN | TEMPERATURE: 98.1 F | SYSTOLIC BLOOD PRESSURE: 137 MMHG

## 2025-02-26 DIAGNOSIS — T78.3XXA ANGIOEDEMA, INITIAL ENCOUNTER: Primary | ICD-10-CM

## 2025-02-26 LAB
ANION GAP SERPL CALCULATED.3IONS-SCNC: 12 MMOL/L (ref 3–16)
BASOPHILS # BLD: 0.1 K/UL (ref 0–0.2)
BASOPHILS NFR BLD: 0.7 %
BUN SERPL-MCNC: 18 MG/DL (ref 7–20)
CALCIUM SERPL-MCNC: 9.5 MG/DL (ref 8.3–10.6)
CHLORIDE SERPL-SCNC: 104 MMOL/L (ref 99–110)
CO2 SERPL-SCNC: 24 MMOL/L (ref 21–32)
CREAT SERPL-MCNC: 0.8 MG/DL (ref 0.8–1.3)
DEPRECATED RDW RBC AUTO: 13.4 % (ref 12.4–15.4)
EOSINOPHIL # BLD: 0.1 K/UL (ref 0–0.6)
EOSINOPHIL NFR BLD: 0.9 %
GFR SERPLBLD CREATININE-BSD FMLA CKD-EPI: >90 ML/MIN/{1.73_M2}
GLUCOSE SERPL-MCNC: 120 MG/DL (ref 70–99)
HCT VFR BLD AUTO: 39.7 % (ref 40.5–52.5)
HGB BLD-MCNC: 13.6 G/DL (ref 13.5–17.5)
LYMPHOCYTES # BLD: 1.9 K/UL (ref 1–5.1)
LYMPHOCYTES NFR BLD: 17.9 %
MCH RBC QN AUTO: 33.4 PG (ref 26–34)
MCHC RBC AUTO-ENTMCNC: 34.2 G/DL (ref 31–36)
MCV RBC AUTO: 97.6 FL (ref 80–100)
MONOCYTES # BLD: 0.9 K/UL (ref 0–1.3)
MONOCYTES NFR BLD: 8.4 %
NEUTROPHILS # BLD: 7.6 K/UL (ref 1.7–7.7)
NEUTROPHILS NFR BLD: 72.1 %
PLATELET # BLD AUTO: 189 K/UL (ref 135–450)
PMV BLD AUTO: 8.7 FL (ref 5–10.5)
POTASSIUM SERPL-SCNC: 4 MMOL/L (ref 3.5–5.1)
RBC # BLD AUTO: 4.07 M/UL (ref 4.2–5.9)
SODIUM SERPL-SCNC: 140 MMOL/L (ref 136–145)
WBC # BLD AUTO: 10.6 K/UL (ref 4–11)

## 2025-02-26 PROCEDURE — 85025 COMPLETE CBC W/AUTO DIFF WBC: CPT

## 2025-02-26 PROCEDURE — 6360000002 HC RX W HCPCS: Performed by: STUDENT IN AN ORGANIZED HEALTH CARE EDUCATION/TRAINING PROGRAM

## 2025-02-26 PROCEDURE — 96375 TX/PRO/DX INJ NEW DRUG ADDON: CPT

## 2025-02-26 PROCEDURE — 99284 EMERGENCY DEPT VISIT MOD MDM: CPT

## 2025-02-26 PROCEDURE — 2500000003 HC RX 250 WO HCPCS: Performed by: STUDENT IN AN ORGANIZED HEALTH CARE EDUCATION/TRAINING PROGRAM

## 2025-02-26 PROCEDURE — 96374 THER/PROPH/DIAG INJ IV PUSH: CPT

## 2025-02-26 PROCEDURE — 2580000003 HC RX 258: Performed by: STUDENT IN AN ORGANIZED HEALTH CARE EDUCATION/TRAINING PROGRAM

## 2025-02-26 PROCEDURE — 80048 BASIC METABOLIC PNL TOTAL CA: CPT

## 2025-02-26 RX ORDER — DIPHENHYDRAMINE HYDROCHLORIDE 50 MG/ML
25 INJECTION INTRAMUSCULAR; INTRAVENOUS ONCE
Status: COMPLETED | OUTPATIENT
Start: 2025-02-26 | End: 2025-02-26

## 2025-02-26 RX ORDER — 0.9 % SODIUM CHLORIDE 0.9 %
1000 INTRAVENOUS SOLUTION INTRAVENOUS ONCE
Status: COMPLETED | OUTPATIENT
Start: 2025-02-26 | End: 2025-02-26

## 2025-02-26 RX ADMIN — FAMOTIDINE 20 MG: 10 INJECTION, SOLUTION INTRAVENOUS at 16:15

## 2025-02-26 RX ADMIN — SODIUM CHLORIDE 1000 ML: 0.9 INJECTION, SOLUTION INTRAVENOUS at 16:13

## 2025-02-26 RX ADMIN — WATER 60 MG: 1 INJECTION INTRAMUSCULAR; INTRAVENOUS; SUBCUTANEOUS at 16:15

## 2025-02-26 RX ADMIN — DIPHENHYDRAMINE HYDROCHLORIDE 25 MG: 50 INJECTION INTRAMUSCULAR; INTRAVENOUS at 16:14

## 2025-02-26 ASSESSMENT — ENCOUNTER SYMPTOMS
SORE THROAT: 0
DIARRHEA: 0
ABDOMINAL PAIN: 0
CONSTIPATION: 0
RHINORRHEA: 0
CHEST TIGHTNESS: 0
BACK PAIN: 1
SHORTNESS OF BREATH: 0

## 2025-02-26 ASSESSMENT — PAIN DESCRIPTION - LOCATION: LOCATION: BACK

## 2025-02-26 ASSESSMENT — PAIN - FUNCTIONAL ASSESSMENT
PAIN_FUNCTIONAL_ASSESSMENT: 0-10
PAIN_FUNCTIONAL_ASSESSMENT: PREVENTS OR INTERFERES SOME ACTIVE ACTIVITIES AND ADLS

## 2025-02-26 ASSESSMENT — PAIN SCALES - GENERAL: PAINLEVEL_OUTOF10: 6

## 2025-02-26 ASSESSMENT — PAIN DESCRIPTION - DESCRIPTORS: DESCRIPTORS: ACHING

## 2025-02-26 ASSESSMENT — PAIN DESCRIPTION - FREQUENCY: FREQUENCY: INTERMITTENT

## 2025-02-26 ASSESSMENT — LIFESTYLE VARIABLES
HOW MANY STANDARD DRINKS CONTAINING ALCOHOL DO YOU HAVE ON A TYPICAL DAY: PATIENT DOES NOT DRINK
HOW OFTEN DO YOU HAVE A DRINK CONTAINING ALCOHOL: NEVER

## 2025-02-26 ASSESSMENT — PAIN DESCRIPTION - PAIN TYPE: TYPE: ACUTE PAIN;CHRONIC PAIN

## 2025-02-26 NOTE — ED TRIAGE NOTES
Pt comes to the ED with oral swelling that started this morning after increasing Lisinopril last night.

## 2025-02-26 NOTE — ED PROVIDER NOTES
[facility]  EMERGENCY DEPARTMENT ENCOUNTER          ATTENDING PHYSICIAN NOTE       Date of evaluation: 2/26/2025    Chief Complaint     Oral Swelling (Pt started taking Cyclobenzaprine last night, and was instructed to increase Lisinopril 20mg to 40mg. Took extra 20mg at 1700 last evening (02/25) // took 40 mg this morning (02/26))      History of Present Illness     Duarte Dc is a 69 y.o. male who presents with lip swelling that started this morning.  He was started on cyclobenzaprine yesterday and had to double his lisinopril dose last night and the swelling started this morning.  Lip swelling is progressively gotten worse over the course of the day today but does not seem to involve his tongue or throat.  He is not having any difficulty swallowing or speaking.  He has never had anything like this before.  He has been on lisinopril for a long time but this was the first time they ever tried to double his dose.    ASSESSMENT / PLAN  (MEDICAL DECISION MAKING)     INITIAL VITALS: BP: (!) 145/91, Temp: 98.5 °F (36.9 °C), Pulse: 97, Respirations: 16, SpO2: 98 %      Duarte Dc is a 69 y.o. male presenting with lip swelling.    On examination, patient has evidence of angioedema in superior and inferior lips.  No evidence of tongue swelling or oropharyngeal involvement.  The patient is tolerating his secretions and normoxic on room air.  I suspect his angioedema as a result of doubling his lisinopril dose last night.    Patient was given a dose of Solu-Medrol, Pepcid, Benadryl, and 1 L of IV fluids and observed for approximately 4 hours.  Over the period of 4 hours, the patient's angioedema improved and he remained stable.  Lisinopril was added to his allergy list and the patient was instructed to discontinue this medication.  Recommend he call his PCP in the morning to discuss an alternative antihypertensive.  Recommend he return to the emergency department with any worsening swelling of his

## 2025-02-26 NOTE — TELEPHONE ENCOUNTER
I called to check on patient who was seen in the office yesterday and he said that he was feeling better today than yesterday, blood pressure this morning was 141/77 and pulse 77. He said that he is still having some back pain but his movement is better and he only took 1 tablet for spasms.  He did get the updated summary but don't know if he should be looking for something.

## 2025-02-27 ENCOUNTER — TELEPHONE (OUTPATIENT)
Dept: PRIMARY CARE CLINIC | Age: 70
End: 2025-02-27

## 2025-02-27 ENCOUNTER — PATIENT MESSAGE (OUTPATIENT)
Dept: PRIMARY CARE CLINIC | Age: 70
End: 2025-02-27

## 2025-02-27 LAB
BACTERIA URNS QL MICRO: ABNORMAL /HPF
BILIRUB UR QL STRIP.AUTO: NEGATIVE
CLARITY UR: CLEAR
COLOR UR: ABNORMAL
EPI CELLS #/AREA URNS AUTO: 0 /HPF (ref 0–5)
GLUCOSE UR STRIP.AUTO-MCNC: NEGATIVE MG/DL
HGB UR QL STRIP.AUTO: NEGATIVE
HYALINE CASTS #/AREA URNS AUTO: 0 /LPF (ref 0–8)
KETONES UR STRIP.AUTO-MCNC: 15 MG/DL
LEUKOCYTE ESTERASE UR QL STRIP.AUTO: NEGATIVE
NITRITE UR QL STRIP.AUTO: NEGATIVE
PH UR STRIP.AUTO: 6.5 [PH] (ref 5–8)
PROT UR STRIP.AUTO-MCNC: ABNORMAL MG/DL
RBC CLUMPS #/AREA URNS AUTO: 2 /HPF (ref 0–4)
SP GR UR STRIP.AUTO: 1.02 (ref 1–1.03)
UA DIPSTICK W REFLEX MICRO PNL UR: YES
URN SPEC COLLECT METH UR: ABNORMAL
UROBILINOGEN UR STRIP-ACNC: 1 E.U./DL
WBC #/AREA URNS AUTO: 0 /HPF (ref 0–5)

## 2025-02-27 RX ORDER — LOSARTAN POTASSIUM AND HYDROCHLOROTHIAZIDE 12.5; 5 MG/1; MG/1
1 TABLET ORAL DAILY
Qty: 30 TABLET | Refills: 1 | Status: SHIPPED | OUTPATIENT
Start: 2025-02-27

## 2025-02-27 NOTE — TELEPHONE ENCOUNTER
Patient did go to the hospital yesterday and was dx with Angioedema by the doctor and was told to have a new rx sent to the pharmacy to replace the Lisinopril for his high blood pressure which the doctor said was causing him to have an allergic reaction with swelling of the mouth area.

## 2025-03-01 ENCOUNTER — HOSPITAL ENCOUNTER (EMERGENCY)
Age: 70
Discharge: HOME | End: 2025-03-01
Attending: EMERGENCY MEDICINE
Payer: MEDICARE

## 2025-03-01 VITALS
SYSTOLIC BLOOD PRESSURE: 164 MMHG | WEIGHT: 262 LBS | BODY MASS INDEX: 32.58 KG/M2 | HEART RATE: 78 BPM | TEMPERATURE: 98.7 F | DIASTOLIC BLOOD PRESSURE: 99 MMHG | OXYGEN SATURATION: 95 % | RESPIRATION RATE: 18 BRPM | HEIGHT: 75 IN

## 2025-03-01 DIAGNOSIS — I15.9 SECONDARY HYPERTENSION: Primary | ICD-10-CM

## 2025-03-01 PROCEDURE — 93005 ELECTROCARDIOGRAM TRACING: CPT

## 2025-03-01 PROCEDURE — 99283 EMERGENCY DEPT VISIT LOW MDM: CPT

## 2025-03-01 NOTE — DISCHARGE INSTRUCTIONS
You were seen in the emergency room today for high blood pressure.  As we discussed, you are not having any symptoms.  Your EKG did not show any problems with your heart.  As you are switching medications, please allow several days for the medication to take effect.  Do not check your blood pressure more than once or twice a day unless you have vision changes, severe headache, chest pain that is not going away, trouble urinating or swelling in your legs.  If you have the symptoms, check your blood pressure and consider following up with a medical provider.  Otherwise, it is safe for you to go to your appointments next week and to see your PCP the week after that.  Continue to take your medication as prescribed.  Return to the emergency department at anytime for any new, concerning or worsening symptoms.

## 2025-03-01 NOTE — ED PROVIDER NOTES
ED Attending Attestation Note     Date of evaluation: 3/1/2025    This patient was seen by the resident.  I have seen and examined the patient, agree with the workup, evaluation, management and diagnosis. The care plan has been discussed.  I have reviewed the ECG and concur with the resident's interpretation.  My assessment reveals a 69 y.o. male with HTN, recent angioedema on ACEI, no longer on ACEI but has poorly controlled BP.  Patient is very anxious about this.  Reports BP's 160-180 systolic with no headache.  Has occasional nonexertional and fleeting \"muscle pain\" in his upper chest, none recently.  On exam he is anxious appearing, breathing comfortably and in no distress.  Abdomen soft.         Rebecca Rudolph MD  03/01/25 [id number]

## 2025-03-01 NOTE — DISCHARGE INSTR - COC
Continuity of Care Form    Patient Name: Duarte Dc   :  1955  MRN:  [id number]    Admit date:  3/1/2025  Discharge date:  ***    Code Status Order: Prior   Advance Directives:   Advance Care Flowsheet Documentation             Admitting Physician:  No admitting provider for patient encounter.  PCP: Charley Piper MD    Discharging Nurse: ***  Discharging Hospital Unit/Room#: B18/B18-18  Discharging Unit Phone Number: ***    Emergency Contact:   Extended Emergency Contact Information  Primary Emergency Contact: Abdirashid Dc  Address: Brother   [facility]  Home Phone: [phone]  Relation: Brother/Sister    Past Surgical History:  Past Surgical History:   Procedure Laterality Date    PROSTATE SURGERY      prostate removal    TONSILLECTOMY      UMBILICAL HERNIA REPAIR N/A 2021    OPEN UMBILICAL HERNIA REPAIR WITH MESH AND POSSIBLE SMALL BOWEL RESECTION performed by Duarte Us MD at [facility] OR       Immunization History:   Immunization History   Administered Date(s) Administered    COVID-19, J&J, (age 18y+), IM, 0.5 mL 2021    COVID-19, PFIZER Bivalent, DO NOT Dilute, (age 12y+), IM, 30 mcg/0.3 mL 10/27/2022    COVID-19, PFIZER GRAY top, DO NOT Dilute, (age 12 y+), IM, 30 mcg/0.3 mL 2022    COVID-19, PFIZER PURPLE top, DILUTE for use, (age 12 y+), 30mcg/0.3mL 2021    COVID-19, PFIZER, (age 12y+), IM, 30mcg/0.3mL 10/24/2023, 10/22/2024    Influenza Vaccine, unspecified formulation 2016, 10/30/2017    Influenza Virus Vaccine 2012, 10/30/2013, 2015, 2019, 2020    Influenza Whole 2012, 10/30/2013    Influenza, AFLURIA (age 3 y+), FLUZONE, (age 6 mo+), Quadv MDV, 0.5mL 2015    Influenza, FLUARIX, FLULAVAL, FLUZONE (age 6 mo+) and AFLURIA, (age 3 y+), Quadv PF, 0.5mL 10/27/2017    Influenza, FLUBLOK, (age 18 y+), Quadv PF, 0.5mL 10/29/2019, 10/22/2020    Influenza, FLUCELVAX, (age 6 mo+), MDCK, Quadv PF, 0.5mL 10/26/2018

## 2025-03-01 NOTE — ED NOTES
Patient discharged to home in good condition. Vital signs remain stable and within normal limits. Was provided with copy of discharge instructions and all questions were answered. Follow up care was explained to patient and they expressed agreement with the treatment plan for follow up. Respirations remain even and unlabored. No acute distress is noted.        Fercho Perez, RN  03/01/25 [id number]

## 2025-03-01 NOTE — ED PROVIDER NOTES
[facility]  EMERGENCY DEPARTMENT ENCOUNTER          EM RESIDENT NOTE       Date of evaluation: 3/1/2025    Chief Complaint     Hypertension (Pt comes to ED for HTN 180systolic at home. Pt was here on Wednesday for reaction to lisiniopril and changed the HCTZ and BP is no longer in control)      History of Present Illness     Duarte Dc is a 69 y.o. male with a past medical history of hypertension who presents high blood pressure.  Patient recently had a medication change from lisinopril to hydrochlorothiazide after he had an episode where increasing his lisinopril led to angioedema.  Since changing to hydrochlorothiazide, he has been measuring his blood pressure at home and it has been as high as 180s systolic.  He does report chronic chest pain with movement but no new severe chest pain.  No dizziness, vision changes or difficulty ambulating.  No swelling in his legs other than baseline swelling nor decreased urination.  No change to his diet.  He has not lost consciousness nor felt lightheaded.    Other than stated above, no additional associated symptoms or aggravating/alleviating factors are noted.    MEDICAL DECISION MAKING / ASSESSMENT / PLAN     INITIAL VITALS: BP: (!) 171/111, Temp: 98.7 °F (37.1 °C), Pulse: 66, Respirations: 26, SpO2: 96 %    Nursing Notes, Past Medical Hx, Past Surgical Hx, Social Hx, Allergies, and Family Hx were reviewed.    Upon presentation, the patient was well-appearing, afebrile and hemodynamically stable.  Patient arrives with hypertension with systolic blood pressure in the 160s here.  He has a very normal neurological examination and mental status which lowers concern for press.  He has no significant lower extremity nor truncal edema concerning for acute kidney injury.  No vision changes or other visual disturbance concerning for hypertensive retinopathy.  He does have some chronic chest pain that is not worse at this time but will obtain a screening EKG to  MOUTH DAILY WITH BREAKFAST    PRAVASTATIN (PRAVACHOL) 20 MG TABLET    TAKE 1 TABLET BY MOUTH DAILY    THERAPEUTIC MULTIVITAMIN-MINERALS (THERAGRAN-M) TABLET    Take 1 tablet by mouth daily       Allergies     He is allergic to lisinopril.    Physical Exam     GENERAL APPEARANCE: NAD, well developed/well nourished, no cyanosis, pallor, or diaphoresis.  EYES: Lids/conjunctiva normal.  EARS/NOSE/THROAT: Mucous membranes moist, uvula midline, no oral pharyngeal erythema, exudate or swelling. No lymphangitis/lymphedema.  HEAD/NECK: Normocephalic, atraumatic, neck is supple.  RESPIRATORY: Respiratory effort normal, speaks in full sentences, no tripod position, no accessory muscle use. Lungs clear to auscultation without rhonchi, wheezes, rales.  CARDIAC: Normal rate and regular rhythm, no edema.  ABDOMINAL: Soft, non-distended, and non-tender. No evidence of fluid wave. No pulsatile masses on exam, rebound tenderness, David sign or pain over Mcburney's point.  MUSCLES/EXTREMITIES: No abnormal range of motion, no swelling.  SKIN: Warm, pink and dry. No rashes, petechiae or lesions.  NEUROLOGICAL: Speech is clear and appropriate. Normal level of consciousness. 5/5 strength in all extremities. Gait and coordination normal.  PSYCH: Normal mood and affect. Judgement/competence is appropriate.      Kamaljit Stevenson MD  Resident  03/01/25 [id number]

## 2025-03-02 LAB
EKG ATRIAL RATE: 76 BPM
EKG DIAGNOSIS: NORMAL
EKG P AXIS: 32 DEGREES
EKG P-R INTERVAL: 168 MS
EKG Q-T INTERVAL: 392 MS
EKG QRS DURATION: 102 MS
EKG QTC CALCULATION (BAZETT): 441 MS
EKG R AXIS: -15 DEGREES
EKG T AXIS: 11 DEGREES
EKG VENTRICULAR RATE: 76 BPM

## 2025-03-04 ENCOUNTER — HOSPITAL ENCOUNTER (OUTPATIENT)
Dept: PHYSICAL THERAPY | Age: 70
Setting detail: THERAPIES SERIES
Discharge: HOME | End: 2025-03-04
Payer: MEDICARE

## 2025-03-04 PROCEDURE — 97161 PT EVAL LOW COMPLEX 20 MIN: CPT | Performed by: PHYSICAL THERAPIST

## 2025-03-04 PROCEDURE — 97140 MANUAL THERAPY 1/> REGIONS: CPT | Performed by: PHYSICAL THERAPIST

## 2025-03-06 ENCOUNTER — HOSPITAL ENCOUNTER (OUTPATIENT)
Dept: CT IMAGING | Age: 70
Discharge: HOME | End: 2025-03-06
Payer: MEDICARE

## 2025-03-06 DIAGNOSIS — K76.9 LIVER LESION: ICD-10-CM

## 2025-03-06 PROCEDURE — 74178 CT ABD&PLV WO CNTR FLWD CNTR: CPT

## 2025-03-06 PROCEDURE — 6360000004 HC RX CONTRAST MEDICATION: Performed by: FAMILY MEDICINE

## 2025-03-06 RX ORDER — IOPAMIDOL 755 MG/ML
75 INJECTION, SOLUTION INTRAVASCULAR
Status: COMPLETED | OUTPATIENT
Start: 2025-03-06 | End: 2025-03-06

## 2025-03-06 RX ADMIN — IOPAMIDOL 75 ML: 755 INJECTION, SOLUTION INTRAVENOUS at 12:52

## 2025-03-11 ENCOUNTER — OFFICE VISIT (OUTPATIENT)
Dept: PRIMARY CARE CLINIC | Age: 70
End: 2025-03-11

## 2025-03-11 VITALS
RESPIRATION RATE: 16 BRPM | BODY MASS INDEX: 31.46 KG/M2 | WEIGHT: 253 LBS | HEART RATE: 88 BPM | OXYGEN SATURATION: 98 % | DIASTOLIC BLOOD PRESSURE: 79 MMHG | SYSTOLIC BLOOD PRESSURE: 131 MMHG | TEMPERATURE: 98.8 F | HEIGHT: 75 IN

## 2025-03-11 DIAGNOSIS — I10 ESSENTIAL HYPERTENSION: Primary | Chronic | ICD-10-CM

## 2025-03-11 DIAGNOSIS — E11.9 CONTROLLED TYPE 2 DIABETES MELLITUS WITHOUT COMPLICATION, WITHOUT LONG-TERM CURRENT USE OF INSULIN (HCC): ICD-10-CM

## 2025-03-11 RX ORDER — LOSARTAN POTASSIUM AND HYDROCHLOROTHIAZIDE 12.5; 5 MG/1; MG/1
2 TABLET ORAL DAILY
Qty: 180 TABLET | Refills: 1 | Status: SHIPPED | OUTPATIENT
Start: 2025-03-11

## 2025-03-11 RX ORDER — LOSARTAN POTASSIUM AND HYDROCHLOROTHIAZIDE 12.5; 5 MG/1; MG/1
1.5 TABLET ORAL DAILY
Qty: 135 TABLET | Refills: 1 | Status: SHIPPED | OUTPATIENT
Start: 2025-03-11 | End: 2025-03-11

## 2025-03-11 ASSESSMENT — ENCOUNTER SYMPTOMS
SHORTNESS OF BREATH: 0
ABDOMINAL PAIN: 0
DIARRHEA: 0
RHINORRHEA: 0
CHEST TIGHTNESS: 0
SORE THROAT: 0
CONSTIPATION: 0

## 2025-03-11 NOTE — PATIENT INSTRUCTIONS
Dear Duarte,     It was a pleasure seeing you today- as discussed please consider starting home blood pressure monitoring. I have attached instructions below.    Home blood pressure monitoring generally results in lower blood pressure readings than in-office measurements, can confirm the diagnosis of hypertension after an elevated office blood pressure reading, and can identify patients with white coat hypertension or masked hypertension.    Best practices for home blood pressure monitoring include:  - Using an appropriately fitting upper-arm cuff on a bare arm,-  - Emptying the bladder of urine  - Avoiding caffeinated beverages for 30 minutes before taking the measurement.  - Resting for five minutes before taking the measurement,  - Keeping the feet on the floor uncrossed and the arm supported with the cuff at heart level  - Not talking during the reading.   - Cut back on sodium,stress, improve your sleep and increase physical activity.    Ideally two readings in the morning and again in the evening  by at least one minute each, is recommended for one week.    Please send me your readings.    Your goal blood pressure is: less than 130/80

## 2025-03-11 NOTE — PROGRESS NOTES
swelling.   Gastrointestinal:  Negative for abdominal pain, constipation and diarrhea.   Genitourinary:  Negative for dysuria and frequency.   Musculoskeletal:  Negative for arthralgias.   Neurological:  Negative for dizziness, weakness and headaches.   Psychiatric/Behavioral:  Negative for hallucinations.    All other systems reviewed and are negative.     ROS above this line reviewed by Provider.      Objective:   /79   Pulse 88   Temp 98.8 °F (37.1 °C) (Oral)   Resp 16   Ht 1.905 m (6' 3\")   Wt 114.8 kg (253 lb)   SpO2 98%   BMI 31.62 kg/m²   Physical Exam  Vitals and nursing note reviewed.   Constitutional:       General: He is not in acute distress.     Appearance: Normal appearance. He is normal weight. He is not ill-appearing, toxic-appearing or diaphoretic.   HENT:      Head: Normocephalic and atraumatic.   Eyes:      General: No scleral icterus.     Conjunctiva/sclera: Conjunctivae normal.   Cardiovascular:      Rate and Rhythm: Normal rate and regular rhythm.      Heart sounds: Normal heart sounds. No murmur heard.     No friction rub. No gallop.   Pulmonary:      Effort: Pulmonary effort is normal. No respiratory distress.      Breath sounds: Normal breath sounds. No stridor. No wheezing, rhonchi or rales.   Musculoskeletal:      Cervical back: Normal range of motion.   Skin:     General: Skin is warm and dry.   Neurological:      Mental Status: He is alert.   Psychiatric:         Mood and Affect: Mood normal.         Behavior: Behavior normal.       Assessment and Plan:   1. Essential hypertension  Currently well-controlled continue current regimen  - losartan-hydroCHLOROthiazide (HYZAAR) 50-12.5 MG per tablet; Take 2 tablets by mouth daily  Dispense: 180 tablet; Refill: 1    2. Controlled type 2 diabetes mellitus without complication, without long-term current use of insulin ([facility])  Advised to obtain labs before   - Hemoglobin A1C; Future           This chart note was prepared using a voice

## 2025-03-12 ENCOUNTER — HOSPITAL ENCOUNTER (OUTPATIENT)
Dept: PHYSICAL THERAPY | Age: 70
Setting detail: THERAPIES SERIES
Discharge: HOME | End: 2025-03-12
Payer: MEDICARE

## 2025-03-12 PROCEDURE — 97140 MANUAL THERAPY 1/> REGIONS: CPT | Performed by: PHYSICAL THERAPIST

## 2025-03-12 PROCEDURE — 97110 THERAPEUTIC EXERCISES: CPT | Performed by: PHYSICAL THERAPIST

## 2025-03-15 NOTE — FLOWSHEET NOTE
[facility] - Outpatient Rehabilitation and Therapy: [address], [place] [address] office: [phone] fax: [phone]           Physical Therapy: TREATMENT/PROGRESS NOTE   Patient: Duarte Dc (69 y.o. male)   Examination Date: 2025   :  1955 MRN: [id number]   Visit #: 2   Insurance Allowable Auth Needed    []Yes    []No    Insurance: Payor: MEDICARE / Plan: MEDICARE PART A AND B / Product Type: *No Product type* /   Insurance ID: [id number] - (Medicare)  Secondary Insurance (if applicable): [place] [facility] *   Treatment Diagnosis: neck pain - m54.2   No diagnosis found.   Medical Diagnosis:  Strain of right trapezius muscle, initial encounter [S46.811A]   Referring Physician: Charley Piper MD  PCP: Charley Piper MD     Plan of care signed (Y/N):     Date of Patient follow up with Physician:      Plan of Care Report: EVAL today  POC update due: (10 visits /OR AUTH LIMITS, whichever is less)  2025                                             Medical History:  Comorbidities:  Diabetes (Type I or II)  Relevant Medical History: hyperlipidemia                                          Precautions/ Contra-indications:           Latex allergy:  NO  Pacemaker:    NO  Contraindications for Manipulation: None  Date of Surgery:   Other:    Red Flags:  None    Suicide Screening:   The patient did not verbalize a primary behavioral concern, suicidal ideation, suicidal intent, or demonstrate suicidal behaviors.    Preferred Language for Healthcare:   [x] English       [] other:    SUBJECTIVE EXAMINATION     Patient stated complaint: \"a lot of improvement since the first visit.\"   Test used Initial score  3/8/25 2025   Pain Summary VAS  8   Functional questionnaire Neck Disability Index  66%   Other:              Pain:  Pain location: B mid cervical   Patient describes pain to be constant, Sharp, dull, aching, burning, squeezing, tender, and

## 2025-03-19 ENCOUNTER — HOSPITAL ENCOUNTER (OUTPATIENT)
Dept: PHYSICAL THERAPY | Age: 70
Setting detail: THERAPIES SERIES
Discharge: HOME | End: 2025-03-19
Payer: MEDICARE

## 2025-03-19 PROCEDURE — 97110 THERAPEUTIC EXERCISES: CPT | Performed by: PHYSICAL THERAPIST

## 2025-03-19 PROCEDURE — 97140 MANUAL THERAPY 1/> REGIONS: CPT | Performed by: PHYSICAL THERAPIST

## 2025-03-20 NOTE — PROGRESS NOTES
Behavioral Health Consultation  Althea Hart Psy.D.  Psychologist  3/21/2025        Time spent with client or family: 45 minutes  This is patient's first [facility] appointment.    Reason for Consult:    Chief Complaint   Patient presents with    Anxiety     Referring Provider: Charley Piper MD  [address]  [address] [place] [id number]    Client and/or guardian provided informed consent for the behavioral health program. Discussed with client and/or guadian model of service to include the limits of confidentiality (i.e. abuse reporting, suicide intervention, etc.) and short-term intervention focused approach.  Family indicated understanding and agreement to limits of confidentiality.  Feedback given to referring provider.    Subjective:  Family:  Duarte resides in his home by himself, he has a relationship with his mother.  Duarte reports not being close with his brother. His mother helps with some mild caregiving for his mother (taking care of finances), very close.  Family history is significant for anxiety (mother, niece and nephew), OCD-like behaviors (mother, niece).  Current stressors reported include: he helps with caregiving with his mother, has to manage finances for several members.  In his free time he enjoys playing games on the computer, reading.    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  Currently, Duarte is reported to demonstrate self-sufficiency in most areas of self-care.  Current health concerns include he has benign cysts on liver, monitoring blood pressure. He has had cancer twice, has been cancer-free at least ten years.  He is currently retired, feels he has enough activity.    Duarte does not have difficulties sleeping, he is waking up more often to urinate as a side effect of medication.  He will rarely take naps while reading if tired.  Duarte does not have diet concerns.  He consumes caffeine in the form of three quarters of 2 liter of

## 2025-03-21 ENCOUNTER — OFFICE VISIT (OUTPATIENT)
Dept: PSYCHOLOGY | Age: 70
End: 2025-03-21
Payer: MEDICARE

## 2025-03-21 DIAGNOSIS — F41.1 GENERALIZED ANXIETY DISORDER: Primary | ICD-10-CM

## 2025-03-21 PROCEDURE — 1123F ACP DISCUSS/DSCN MKR DOCD: CPT | Performed by: PSYCHOLOGIST

## 2025-03-21 PROCEDURE — 1036F TOBACCO NON-USER: CPT | Performed by: PSYCHOLOGIST

## 2025-03-21 PROCEDURE — 90791 PSYCH DIAGNOSTIC EVALUATION: CPT | Performed by: PSYCHOLOGIST

## 2025-03-25 ENCOUNTER — OFFICE VISIT (OUTPATIENT)
Dept: PRIMARY CARE CLINIC | Age: 70
End: 2025-03-25
Payer: MEDICARE

## 2025-03-25 VITALS
OXYGEN SATURATION: 97 % | HEART RATE: 85 BPM | TEMPERATURE: 98.6 F | DIASTOLIC BLOOD PRESSURE: 78 MMHG | BODY MASS INDEX: 31.58 KG/M2 | SYSTOLIC BLOOD PRESSURE: 131 MMHG | WEIGHT: 254 LBS | HEIGHT: 75 IN

## 2025-03-25 DIAGNOSIS — I10 ESSENTIAL HYPERTENSION: Primary | ICD-10-CM

## 2025-03-25 DIAGNOSIS — E11.9 CONTROLLED TYPE 2 DIABETES MELLITUS WITHOUT COMPLICATION, WITHOUT LONG-TERM CURRENT USE OF INSULIN: ICD-10-CM

## 2025-03-25 LAB — HBA1C MFR BLD: 6.3 %

## 2025-03-25 PROCEDURE — 1036F TOBACCO NON-USER: CPT | Performed by: FAMILY MEDICINE

## 2025-03-25 PROCEDURE — 3017F COLORECTAL CA SCREEN DOC REV: CPT | Performed by: FAMILY MEDICINE

## 2025-03-25 PROCEDURE — 3078F DIAST BP <80 MM HG: CPT | Performed by: FAMILY MEDICINE

## 2025-03-25 PROCEDURE — 2022F DILAT RTA XM EVC RTNOPTHY: CPT | Performed by: FAMILY MEDICINE

## 2025-03-25 PROCEDURE — 83036 HEMOGLOBIN GLYCOSYLATED A1C: CPT | Performed by: FAMILY MEDICINE

## 2025-03-25 PROCEDURE — 1160F RVW MEDS BY RX/DR IN RCRD: CPT | Performed by: FAMILY MEDICINE

## 2025-03-25 PROCEDURE — 99214 OFFICE O/P EST MOD 30 MIN: CPT | Performed by: FAMILY MEDICINE

## 2025-03-25 PROCEDURE — 1159F MED LIST DOCD IN RCRD: CPT | Performed by: FAMILY MEDICINE

## 2025-03-25 PROCEDURE — G8427 DOCREV CUR MEDS BY ELIG CLIN: HCPCS | Performed by: FAMILY MEDICINE

## 2025-03-25 PROCEDURE — 3075F SYST BP GE 130 - 139MM HG: CPT | Performed by: FAMILY MEDICINE

## 2025-03-25 PROCEDURE — G8417 CALC BMI ABV UP PARAM F/U: HCPCS | Performed by: FAMILY MEDICINE

## 2025-03-25 PROCEDURE — 3044F HG A1C LEVEL LT 7.0%: CPT | Performed by: FAMILY MEDICINE

## 2025-03-25 PROCEDURE — 1123F ACP DISCUSS/DSCN MKR DOCD: CPT | Performed by: FAMILY MEDICINE

## 2025-03-25 NOTE — PROGRESS NOTES
Subjective:   Patient ID: Duarte Dc is a 69 y.o. male.  HPI by clinical support staff:   Chief Complaint   Patient presents with    Anxiety     Patient present for anxiety/blood pressure f/u.       Preliminary data above this line collected by clinical support staff.    ______________________________________________________________________  HPI by Provider:   HPI   Patient presents today for follow-up on anxiety and blood pressure.  States home readings have been mainly in the 130s to 140s over 80s and 90s.  Denies any side effects from the current medication.  Denies any chest pain, palpitations or headaches.  States he feels overall he is less anxious with the current Hyzaar prescription.  Did have an appointment with in-house psychologist and is very comfortable seeing her moving forward.   Data above this line collected by Provider.    Patient's medications, allergies, past medical, surgical, social and family histories were reviewed and updated as appropriate.  Patient Care Team:  Charley Piper MD as PCP - General (Family Medicine)  Charley Piper MD as PCP - Empaneled Provider  Current Outpatient Medications on File Prior to Visit   Medication Sig Dispense Refill    losartan-hydroCHLOROthiazide (HYZAAR) 50-12.5 MG per tablet Take 2 tablets by mouth daily 180 tablet 1    metFORMIN (GLUCOPHAGE) 500 MG tablet TAKE 1 TABLET BY MOUTH DAILY WITH BREAKFAST 90 tablet 3    pravastatin (PRAVACHOL) 20 MG tablet TAKE 1 TABLET BY MOUTH DAILY 90 tablet 3    therapeutic multivitamin-minerals (THERAGRAN-M) tablet Take 1 tablet by mouth daily      fish oil-omega-3 fatty acids 1000 MG capsule Take 2 capsules by mouth daily       No current facility-administered medications on file prior to visit.     Review of Systems   Constitutional:  Negative for activity change, appetite change, fatigue and fever.   HENT:  Negative for congestion, rhinorrhea and sore throat.    Respiratory:  Negative for chest tightness

## 2025-03-26 ENCOUNTER — HOSPITAL ENCOUNTER (OUTPATIENT)
Dept: PHYSICAL THERAPY | Age: 70
Setting detail: THERAPIES SERIES
Discharge: HOME | End: 2025-03-26
Payer: MEDICARE

## 2025-03-26 PROCEDURE — 97140 MANUAL THERAPY 1/> REGIONS: CPT | Performed by: PHYSICAL THERAPIST

## 2025-03-26 PROCEDURE — 97110 THERAPEUTIC EXERCISES: CPT | Performed by: PHYSICAL THERAPIST

## 2025-03-27 ASSESSMENT — ENCOUNTER SYMPTOMS
CONSTIPATION: 0
SHORTNESS OF BREATH: 0
RHINORRHEA: 0
DIARRHEA: 0
ABDOMINAL PAIN: 0
SORE THROAT: 0
CHEST TIGHTNESS: 0

## 2025-03-27 NOTE — FLOWSHEET NOTE
Noted cervical/thoracic/Glenohumeral joint hypomobility  Decreased cervical/UE functional ROM  Decreased Deep Cervical Flexor control or ability to hold head up  Decreased Rotator Cuff/Scapular/Core strength and neuromuscular control  Decreased UE functional strength  Abnormal reflexes/sensation/myotomal/dermatomal deficits    Functional Activity Limitations (from functional questionnaire and intake):  Any of your usual work, housework, or school activities  Your usual hobbies, recreational or sporting activities  Lifting a bag of groceries to waist level  Lifting a bag of groceries above your head  Grooming your hair  Pushing up on your hands (eg, from bathtub or chair)  Preparing food (eg, peeling, cutting)  Reduced ability or tolerance with driving  Vacuuming, sweeping or raking  Dressing  Doing up buttons  Using tools or appliances  Opening doors  Cleaning  Tying or lacing shoes  Sleeping  Laundering clothes (eg, washing, ironing, folding)  Opening a jar  Throwing a ball  Carrying a small suitcase with your affected limb  Reaching up into a cabinet  Reduced ability to concentrate  Reduced ability or tolerance with any impact through UE or Spine  Reaching behind back     Participation Restrictions:   Reduced participation in self care  Reduced participation in home management   Reduced participation in work activities  Reduced participation in social activities  Reduced participation in sports/recreation    Classification :   Signs/symptoms consistent with neck pain with mobility deficits    Signs/symptoms consistent with facet dysfunction of cervical and thoracic spine  Signs/symptoms consistent with postural dysfunction       Barriers to/and or personal factors that will affect rehab potential:   Co-morbidities    Physical Therapy Evaluation Complexity Justification  [x] A history of present problem and no personal factors and/or co-morbidities that impact the plan of care  [x] A total of 1-2 elements  found

## 2025-03-28 ENCOUNTER — OFFICE VISIT (OUTPATIENT)
Dept: PSYCHOLOGY | Age: 70
End: 2025-03-28
Payer: MEDICARE

## 2025-03-28 DIAGNOSIS — F41.1 GENERALIZED ANXIETY DISORDER: Primary | ICD-10-CM

## 2025-03-28 PROCEDURE — 1036F TOBACCO NON-USER: CPT | Performed by: PSYCHOLOGIST

## 2025-03-28 PROCEDURE — 90832 PSYTX W PT 30 MINUTES: CPT | Performed by: PSYCHOLOGIST

## 2025-03-28 PROCEDURE — 1123F ACP DISCUSS/DSCN MKR DOCD: CPT | Performed by: PSYCHOLOGIST

## 2025-03-28 NOTE — PROGRESS NOTES
Behavioral Health Consultation  Althea Hart PsyD  Licensed Psychologist  3/28/2025        Time spent with client or family: 25 minutes  This is patient's second [facility] appointment.    Reason for Consult:    Chief Complaint   Patient presents with    Anxiety     Referring Provider: Charley Piper MD  [address]  [place] [address]    Feedback given to referring provider.    S:  Duarte reports that he had anxiety yesterday after having high blood pressure. He uses cold to manage anxiety (unknowingly) but does not otherwise use any somatic coping skills. He sometimes tries to talk back to anxious thinking. He endorses more OCD-like behaviors.   He tells his mother everything, despite it causing more anxiety for both of them; but he feels to not tell her everything would be dishonest.     O:  MSE:  Appearance    alert, cooperative  Appetite normal  Sleep disturbance No  Fatigue No  Loss of pleasure No  Impulsive behavior No  Speech    normal rate and normal volume  Mood    Anxious   Affect    anxiety  Thought Content    intact  Thought Process    linear  Associations    logical connections  Insight    Fair  Judgment    Intact  Orientation    oriented to person, place, time, and general circumstances  Memory    recent and remote memory intact  Attention/Concentration    intact  Morbid ideation No  Suicide Assessment    no suicidal ideation    A:  Duarte presents for a follow up [facility] appointment regarding concerns related to anxiety.  These symptoms are show no change.  Safety concerns reported include: none.    Diagnosis:  1. Generalized anxiety disorder        Plan:  Pt interventions:  Reviewed progress and provided praise for effective coping. Briefly reviewed reinforcement of OCD. Briefly introduced ACT strategies. Briefly introduced diaphragmatic breathing; he will practice. He will also practice identifying anxious thoughts. He will continue to look for small exposures with LUPILLO <4.    Pt

## 2025-04-11 ENCOUNTER — OFFICE VISIT (OUTPATIENT)
Dept: PSYCHOLOGY | Age: 70
End: 2025-04-11
Payer: MEDICARE

## 2025-04-11 DIAGNOSIS — F41.1 GENERALIZED ANXIETY DISORDER: Primary | ICD-10-CM

## 2025-04-11 PROCEDURE — 1123F ACP DISCUSS/DSCN MKR DOCD: CPT | Performed by: PSYCHOLOGIST

## 2025-04-11 PROCEDURE — 1036F TOBACCO NON-USER: CPT | Performed by: PSYCHOLOGIST

## 2025-04-11 PROCEDURE — 90832 PSYTX W PT 30 MINUTES: CPT | Performed by: PSYCHOLOGIST

## 2025-04-11 NOTE — PROGRESS NOTES
Behavioral Health Consultation  Althea Hart PsyD  Licensed Psychologist  4/11/2025        Time spent with client or family: 25 minutes  This is patient's third [facility] appointment.    Reason for Consult:    Chief Complaint   Patient presents with    Anxiety     Referring Provider: Charley Piper MD  [address] [place] [address]    Feedback given to referring provider.    S:  Duarte reports that he has had some nice improvement in symptoms by implementing some of the strategies we introduced in previous session. He has felt the benefits of using temperature changes and diaphragmatic breathing to manage anxiety. He has been looking for opportunities to utilize cognitive skills and has been successful with ERP strategies related to stepping on cracks. His mother became anxious about his blood pressure and Duarte utilized these skills to avoid spiraling into anxiety. His brother became angry with him, and again, he was successful in avoiding this exaggerating anxiety.      O:  MSE:  Appearance    alert, cooperative  Appetite normal  Sleep disturbance No  Fatigue No  Loss of pleasure No  Impulsive behavior No  Speech    normal rate and normal volume  Mood    Anxious   Affect    anxiety  Thought Content    intact  Thought Process    goal directed  Associations    logical connections  Insight    Good  Judgment    Intact  Orientation    oriented to person, place, time, and general circumstances  Memory    recent and remote memory intact  Attention/Concentration    intact  Morbid ideation No  Suicide Assessment    no suicidal ideation    A:  Duarte presents for a follow up [facility] appointment regarding concerns related to anxiety.  These symptoms  are improving.  Safety concerns reported include: none.    Diagnosis:  1. Generalized anxiety disorder        Plan:  Pt interventions:  Reviewed progress and provided praise for effective coping. Discussed how to continue utilizing ERP exposures and

## 2025-04-28 ENCOUNTER — OFFICE VISIT (OUTPATIENT)
Dept: PSYCHOLOGY | Age: 70
End: 2025-04-28
Payer: MEDICARE

## 2025-04-28 DIAGNOSIS — F41.1 GENERALIZED ANXIETY DISORDER: Primary | ICD-10-CM

## 2025-04-28 PROCEDURE — 90832 PSYTX W PT 30 MINUTES: CPT | Performed by: PSYCHOLOGIST

## 2025-04-28 PROCEDURE — 1036F TOBACCO NON-USER: CPT | Performed by: PSYCHOLOGIST

## 2025-04-28 PROCEDURE — 1123F ACP DISCUSS/DSCN MKR DOCD: CPT | Performed by: PSYCHOLOGIST

## 2025-04-28 NOTE — PROGRESS NOTES
Behavioral Health Consultation  Althea Hart PsyD  Licensed Psychologist  4/28/2025        Time spent with client or family: 25 minutes  This is patient's fourth [facility] appointment.    Reason for Consult:    Chief Complaint   Patient presents with    Anxiety     Referring Provider: Charley Piper MD  [address] [place] [address]    Feedback given to referring provider.    S:  Duarte reports that he has seen significant improvement in his ability to manage anxiety, which has in turn decreased occurrence of anxious thoughts. Duarte has been setting some emotional boundaries with his mother; not allowing himself to join her in anxious spiraling. His mother has had some reaction to this, but he feels it is helpful. Duarte has been utilizing CBT and ACT strategies, particularly ACT strategies. He is engaging in less checking behaviors, other OCD-like rituals. He feels proud of his improvements and is grateful for consults.   He has some worry about what would happen if he is not worrying/hypervigilant.    O:  MSE:  Appearance    alert, cooperative  Appetite normal  Sleep disturbance No  Fatigue No  Loss of pleasure No  Impulsive behavior No  Speech    normal rate and normal volume  Mood    euthymic   Affect    normal affect  Thought Content    intact  Thought Process    linear  Associations    logical connections  Insight    Good  Judgment    Intact  Orientation    oriented to person, place, time, and general circumstances  Memory    recent and remote memory intact  Attention/Concentration    intact  Morbid ideation No  Suicide Assessment    no suicidal ideation    A:  Duarte presents for a follow up [facility] appointment regarding concerns related to anxiety.  These symptoms are improving.  Safety concerns reported include: none.    Diagnosis:  1. Generalized anxiety disorder        Plan:  Pt interventions:  Reviewed progress and provided praise for effective coping. Reviewed use of CBT and ACT

## 2025-05-16 ASSESSMENT — PATIENT HEALTH QUESTIONNAIRE - PHQ9
2. FEELING DOWN, DEPRESSED OR HOPELESS: NOT AT ALL
SUM OF ALL RESPONSES TO PHQ QUESTIONS 1-9: 0
7. TROUBLE CONCENTRATING ON THINGS, SUCH AS READING THE NEWSPAPER OR WATCHING TELEVISION: NOT AT ALL
6. FEELING BAD ABOUT YOURSELF - OR THAT YOU ARE A FAILURE OR HAVE LET YOURSELF OR YOUR FAMILY DOWN: NOT AT ALL
5. POOR APPETITE OR OVEREATING: NOT AT ALL
1. LITTLE INTEREST OR PLEASURE IN DOING THINGS: NOT AT ALL
5. POOR APPETITE OR OVEREATING: NOT AT ALL
9. THOUGHTS THAT YOU WOULD BE BETTER OFF DEAD, OR OF HURTING YOURSELF: NOT AT ALL
9. THOUGHTS THAT YOU WOULD BE BETTER OFF DEAD, OR OF HURTING YOURSELF: NOT AT ALL
1. LITTLE INTEREST OR PLEASURE IN DOING THINGS: NOT AT ALL
7. TROUBLE CONCENTRATING ON THINGS, SUCH AS READING THE NEWSPAPER OR WATCHING TELEVISION: NOT AT ALL
3. TROUBLE FALLING OR STAYING ASLEEP: NOT AT ALL
SUM OF ALL RESPONSES TO PHQ QUESTIONS 1-9: 0
10. IF YOU CHECKED OFF ANY PROBLEMS, HOW DIFFICULT HAVE THESE PROBLEMS MADE IT FOR YOU TO DO YOUR WORK, TAKE CARE OF THINGS AT HOME, OR GET ALONG WITH OTHER PEOPLE: NOT DIFFICULT AT ALL
SUM OF ALL RESPONSES TO PHQ QUESTIONS 1-9: 0
4. FEELING TIRED OR HAVING LITTLE ENERGY: NOT AT ALL
6. FEELING BAD ABOUT YOURSELF - OR THAT YOU ARE A FAILURE OR HAVE LET YOURSELF OR YOUR FAMILY DOWN: NOT AT ALL
8. MOVING OR SPEAKING SO SLOWLY THAT OTHER PEOPLE COULD HAVE NOTICED. OR THE OPPOSITE - BEING SO FIDGETY OR RESTLESS THAT YOU HAVE BEEN MOVING AROUND A LOT MORE THAN USUAL: NOT AT ALL
10. IF YOU CHECKED OFF ANY PROBLEMS, HOW DIFFICULT HAVE THESE PROBLEMS MADE IT FOR YOU TO DO YOUR WORK, TAKE CARE OF THINGS AT HOME, OR GET ALONG WITH OTHER PEOPLE: NOT DIFFICULT AT ALL
3. TROUBLE FALLING OR STAYING ASLEEP: NOT AT ALL
SUM OF ALL RESPONSES TO PHQ QUESTIONS 1-9: 0
SUM OF ALL RESPONSES TO PHQ QUESTIONS 1-9: 0
8. MOVING OR SPEAKING SO SLOWLY THAT OTHER PEOPLE COULD HAVE NOTICED. OR THE OPPOSITE, BEING SO FIGETY OR RESTLESS THAT YOU HAVE BEEN MOVING AROUND A LOT MORE THAN USUAL: NOT AT ALL
4. FEELING TIRED OR HAVING LITTLE ENERGY: NOT AT ALL
2. FEELING DOWN, DEPRESSED OR HOPELESS: NOT AT ALL

## 2025-05-16 ASSESSMENT — ANXIETY QUESTIONNAIRES
3. WORRYING TOO MUCH ABOUT DIFFERENT THINGS: NOT AT ALL
4. TROUBLE RELAXING: NOT AT ALL
5. BEING SO RESTLESS THAT IT IS HARD TO SIT STILL: NOT AT ALL
2. NOT BEING ABLE TO STOP OR CONTROL WORRYING: NOT AT ALL
GAD7 TOTAL SCORE: 0
2. NOT BEING ABLE TO STOP OR CONTROL WORRYING: NOT AT ALL
7. FEELING AFRAID AS IF SOMETHING AWFUL MIGHT HAPPEN: NOT AT ALL
6. BECOMING EASILY ANNOYED OR IRRITABLE: NOT AT ALL
5. BEING SO RESTLESS THAT IT IS HARD TO SIT STILL: NOT AT ALL
3. WORRYING TOO MUCH ABOUT DIFFERENT THINGS: NOT AT ALL
6. BECOMING EASILY ANNOYED OR IRRITABLE: NOT AT ALL
7. FEELING AFRAID AS IF SOMETHING AWFUL MIGHT HAPPEN: NOT AT ALL
4. TROUBLE RELAXING: NOT AT ALL
1. FEELING NERVOUS, ANXIOUS, OR ON EDGE: NOT AT ALL
1. FEELING NERVOUS, ANXIOUS, OR ON EDGE: NOT AT ALL

## 2025-05-19 ENCOUNTER — OFFICE VISIT (OUTPATIENT)
Dept: PSYCHOLOGY | Age: 70
End: 2025-05-19
Payer: MEDICARE

## 2025-05-19 DIAGNOSIS — F41.1 GENERALIZED ANXIETY DISORDER: Primary | ICD-10-CM

## 2025-05-19 PROCEDURE — 1123F ACP DISCUSS/DSCN MKR DOCD: CPT | Performed by: PSYCHOLOGIST

## 2025-05-19 PROCEDURE — 1036F TOBACCO NON-USER: CPT | Performed by: PSYCHOLOGIST

## 2025-05-19 PROCEDURE — 90832 PSYTX W PT 30 MINUTES: CPT | Performed by: PSYCHOLOGIST

## 2025-05-19 NOTE — PROGRESS NOTES
Behavioral Health Consultation  Althea Hart PsyD  Licensed Psychologist  5/19/2025        Time spent with client or family: 25 minutes  This is patient's fifth [facility] appointment.    Reason for Consult:    Chief Complaint   Patient presents with    Anxiety     Referring Provider: Charley Piper MD  [address] [place] [address]    Feedback given to referring provider.    S:  Duarte reports that he is doing very well. He notes that there are now often days where he does not experience any anxiety at all. He notes that he has had several potential stressors arise but has navigated these well without letting his anxiety worsen significantly.  Duarte's mother is now less negative in conversations and is responding well to boundaries.  This has given Duarte more energy and time; he has been walking more, enjoying more time outside. He is no longer obsessively checking his blood pressure.     O:  MSE:  Appearance    alert, cooperative  Appetite normal  Sleep disturbance No  Fatigue No  Loss of pleasure No  Impulsive behavior No  Speech    normal rate and normal volume  Mood    euthymic   Affect    normal affect  Thought Content    intact  Thought Process    linear  Associations    logical connections  Insight    Good  Judgment    Intact  Orientation    oriented to person, place, time, and general circumstances  Memory    recent and remote memory intact  Attention/Concentration    intact  Morbid ideation No  Suicide Assessment    no suicidal ideation    A:  Duarte presents for a follow up [facility] appointment regarding concerns related to anxiety.  These symptoms are improving.  Safety concerns reported include: none.    Diagnosis:  1. Generalized anxiety disorder        Plan:  Pt interventions:  Reviewed progress and provided praise for effective coping. Discussed how to continue strategies that are helping him manage this anxiety and conflict with others. Discussed treatment options; he opts to go to

## 2025-06-25 ENCOUNTER — OFFICE VISIT (OUTPATIENT)
Dept: PRIMARY CARE CLINIC | Age: 70
End: 2025-06-25

## 2025-06-25 VITALS
SYSTOLIC BLOOD PRESSURE: 132 MMHG | OXYGEN SATURATION: 97 % | HEIGHT: 75 IN | TEMPERATURE: 98.7 F | BODY MASS INDEX: 30.97 KG/M2 | DIASTOLIC BLOOD PRESSURE: 82 MMHG | HEART RATE: 83 BPM | RESPIRATION RATE: 16 BRPM | WEIGHT: 249.1 LBS

## 2025-06-25 DIAGNOSIS — E11.9 CONTROLLED TYPE 2 DIABETES MELLITUS WITHOUT COMPLICATION, WITHOUT LONG-TERM CURRENT USE OF INSULIN (HCC): Primary | Chronic | ICD-10-CM

## 2025-06-25 DIAGNOSIS — E55.9 VITAMIN D DEFICIENCY: ICD-10-CM

## 2025-06-25 DIAGNOSIS — I10 ESSENTIAL HYPERTENSION: Chronic | ICD-10-CM

## 2025-06-25 DIAGNOSIS — E78.00 PURE HYPERCHOLESTEROLEMIA: Chronic | ICD-10-CM

## 2025-06-25 LAB — HBA1C MFR BLD: 6.4 %

## 2025-06-25 ASSESSMENT — ENCOUNTER SYMPTOMS
SHORTNESS OF BREATH: 0
ABDOMINAL PAIN: 0
RHINORRHEA: 0
DIARRHEA: 0
CONSTIPATION: 0
CHEST TIGHTNESS: 0
SORE THROAT: 0

## 2025-06-25 NOTE — PROGRESS NOTES
Subjective:   Patient ID: Duarte Dc is a 69 y.o. male.  HPI by clinical support staff:   Chief Complaint   Patient presents with    Diabetes     Patient is present today for DM follow up. Last A1c was 6.3 on 3/25/2025      Preliminary data above this line collected by clinical support staff.    ______________________________________________________________________  HPI by Provider:   HPI   Patient presents today for follow-up on diabetes states that he has been trying to walk about 30 minutes a day and has cut out carbs and sugars from his diet.  States compliance with his medications without any noticeable side effects.  Has discontinued alcohol use which has reduced his snacking.   Data above this line collected by Provider.    Patient's medications, allergies, past medical, surgical, social and family histories were reviewed and updated as appropriate.  Patient Care Team:  Charley Piper MD as PCP - General (Family Medicine)  Charley Piper MD as PCP - Empaneled Provider  Current Outpatient Medications on File Prior to Visit   Medication Sig Dispense Refill    losartan-hydroCHLOROthiazide (HYZAAR) 50-12.5 MG per tablet Take 2 tablets by mouth daily (Patient taking differently: Take 1 tablet by mouth daily) 180 tablet 1    metFORMIN (GLUCOPHAGE) 500 MG tablet TAKE 1 TABLET BY MOUTH DAILY WITH BREAKFAST 90 tablet 3    pravastatin (PRAVACHOL) 20 MG tablet TAKE 1 TABLET BY MOUTH DAILY 90 tablet 3    therapeutic multivitamin-minerals (THERAGRAN-M) tablet Take 1 tablet by mouth daily      fish oil-omega-3 fatty acids 1000 MG capsule Take 2 capsules by mouth daily       No current facility-administered medications on file prior to visit.     Review of Systems   Constitutional:  Negative for activity change, appetite change, fatigue and fever.   HENT:  Negative for congestion, rhinorrhea and sore throat.    Respiratory:  Negative for chest tightness and shortness of breath.    Cardiovascular:  Negative

## 2025-08-12 RX ORDER — PRAVASTATIN SODIUM 20 MG
20 TABLET ORAL DAILY
Qty: 90 TABLET | Refills: 0 | Status: SHIPPED | OUTPATIENT
Start: 2025-08-12

## 2025-09-04 DIAGNOSIS — I10 ESSENTIAL HYPERTENSION: Chronic | ICD-10-CM

## 2025-09-04 RX ORDER — LOSARTAN POTASSIUM AND HYDROCHLOROTHIAZIDE 12.5; 5 MG/1; MG/1
1 TABLET ORAL DAILY
Qty: 90 TABLET | Refills: 0 | Status: SHIPPED | OUTPATIENT
Start: 2025-09-04

## (undated) DEVICE — SUTURE ETHBND EXCEL SZ 0 L18IN NONABSORBABLE GRN L26MM MO-6 CX45D

## (undated) DEVICE — TOWEL,STOP FLAG GOLD N-W: Brand: MEDLINE

## (undated) DEVICE — SUTURE PDS II SZ 0 L27IN ABSRB VLT L26MM CT-2 1/2 CIR Z334H

## (undated) DEVICE — SUTURE VCRL SZ 3-0 L27IN ABSRB UD L26MM SH 1/2 CIR J416H

## (undated) DEVICE — CAUTERY TIP POLISHER: Brand: DEVON

## (undated) DEVICE — SHEET, T, LAPAROTOMY, STERILE: Brand: MEDLINE

## (undated) DEVICE — SUTURE VCRL SZ 0 L27IN ABSRB UD L26MM CT-2 1/2 CIR J270H

## (undated) DEVICE — DEVICE SEAL L23CM NANO COAT MARYLAND JAW OPN DIV LIGASURE

## (undated) DEVICE — GENERAL: Brand: MEDLINE INDUSTRIES, INC.

## (undated) DEVICE — SOLUTION IV 1000ML 0.9% SOD CHL

## (undated) DEVICE — ADHESIVE SKIN CLSR 0.7ML TOP DERMBND ADV

## (undated) DEVICE — E-Z CLEAN, NON-STICK, PTFE COATED, ELECTROSURGICAL BLADE ELECTRODE, MODIFIED EXTENDED INSULATION, 2.5 INCH (6.35 CM): Brand: MEGADYNE

## (undated) DEVICE — APPLICATOR PREP 26ML 0.7% IOD POVACRYLEX 74% ISO ALC ST

## (undated) DEVICE — GLOVE SURG SZ 7 L12IN FNGR THK75MIL WHT LTX POLYMER BEAD

## (undated) DEVICE — STAPLER INT L75MM CUT LN L73MM STPL LN L77MM BLU B FRM 8